# Patient Record
Sex: FEMALE | Race: WHITE | NOT HISPANIC OR LATINO | Employment: PART TIME | URBAN - METROPOLITAN AREA
[De-identification: names, ages, dates, MRNs, and addresses within clinical notes are randomized per-mention and may not be internally consistent; named-entity substitution may affect disease eponyms.]

---

## 2017-01-19 ENCOUNTER — TRANSCRIBE ORDERS (OUTPATIENT)
Dept: ADMINISTRATIVE | Facility: HOSPITAL | Age: 62
End: 2017-01-19

## 2017-01-19 DIAGNOSIS — Z12.31 OTHER SCREENING MAMMOGRAM: Primary | ICD-10-CM

## 2017-01-25 ENCOUNTER — HOSPITAL ENCOUNTER (OUTPATIENT)
Dept: MAMMOGRAPHY | Facility: CLINIC | Age: 62
Discharge: HOME | End: 2017-01-25
Payer: COMMERCIAL

## 2017-01-25 DIAGNOSIS — Z12.31 OTHER SCREENING MAMMOGRAM: ICD-10-CM

## 2017-01-25 PROCEDURE — G0202 SCR MAMMO BI INCL CAD: HCPCS

## 2017-02-01 ENCOUNTER — GENERIC CONVERSION - ENCOUNTER (OUTPATIENT)
Dept: OTHER | Facility: OTHER | Age: 62
End: 2017-02-01

## 2017-03-20 ENCOUNTER — APPOINTMENT (OUTPATIENT)
Dept: LAB | Facility: CLINIC | Age: 62
End: 2017-03-20
Payer: COMMERCIAL

## 2017-03-20 DIAGNOSIS — E78.5 HYPERLIPIDEMIA: ICD-10-CM

## 2017-03-20 DIAGNOSIS — R53.83 OTHER FATIGUE: ICD-10-CM

## 2017-03-20 LAB
ALBUMIN SERPL BCP-MCNC: 3.7 G/DL (ref 3.5–5)
ALP SERPL-CCNC: 62 U/L (ref 46–116)
ALT SERPL W P-5'-P-CCNC: 22 U/L (ref 12–78)
ANION GAP SERPL CALCULATED.3IONS-SCNC: 7 MMOL/L (ref 4–13)
AST SERPL W P-5'-P-CCNC: 17 U/L (ref 5–45)
BASOPHILS # BLD AUTO: 0.01 THOUSANDS/ΜL (ref 0–0.1)
BASOPHILS NFR BLD AUTO: 0 % (ref 0–1)
BILIRUB SERPL-MCNC: 0.57 MG/DL (ref 0.2–1)
BUN SERPL-MCNC: 12 MG/DL (ref 5–25)
CALCIUM SERPL-MCNC: 8.7 MG/DL (ref 8.3–10.1)
CHLORIDE SERPL-SCNC: 101 MMOL/L (ref 100–108)
CHOLEST SERPL-MCNC: 226 MG/DL (ref 50–200)
CO2 SERPL-SCNC: 31 MMOL/L (ref 21–32)
CREAT SERPL-MCNC: 0.75 MG/DL (ref 0.6–1.3)
EOSINOPHIL # BLD AUTO: 0.13 THOUSAND/ΜL (ref 0–0.61)
EOSINOPHIL NFR BLD AUTO: 3 % (ref 0–6)
ERYTHROCYTE [DISTWIDTH] IN BLOOD BY AUTOMATED COUNT: 13.6 % (ref 11.6–15.1)
GFR SERPL CREATININE-BSD FRML MDRD: >60 ML/MIN/1.73SQ M
GLUCOSE P FAST SERPL-MCNC: 87 MG/DL (ref 65–99)
HCT VFR BLD AUTO: 42.2 % (ref 34.8–46.1)
HDLC SERPL-MCNC: 78 MG/DL (ref 40–60)
HGB BLD-MCNC: 14.1 G/DL (ref 11.5–15.4)
LDLC SERPL CALC-MCNC: 127 MG/DL (ref 0–100)
LYMPHOCYTES # BLD AUTO: 1.91 THOUSANDS/ΜL (ref 0.6–4.47)
LYMPHOCYTES NFR BLD AUTO: 42 % (ref 14–44)
MCH RBC QN AUTO: 31.3 PG (ref 26.8–34.3)
MCHC RBC AUTO-ENTMCNC: 33.4 G/DL (ref 31.4–37.4)
MCV RBC AUTO: 94 FL (ref 82–98)
MONOCYTES # BLD AUTO: 0.28 THOUSAND/ΜL (ref 0.17–1.22)
MONOCYTES NFR BLD AUTO: 6 % (ref 4–12)
NEUTROPHILS # BLD AUTO: 2.23 THOUSANDS/ΜL (ref 1.85–7.62)
NEUTS SEG NFR BLD AUTO: 49 % (ref 43–75)
NRBC BLD AUTO-RTO: 0 /100 WBCS
PLATELET # BLD AUTO: 243 THOUSANDS/UL (ref 149–390)
PMV BLD AUTO: 11.4 FL (ref 8.9–12.7)
POTASSIUM SERPL-SCNC: 3.8 MMOL/L (ref 3.5–5.3)
PROT SERPL-MCNC: 6.9 G/DL (ref 6.4–8.2)
RBC # BLD AUTO: 4.51 MILLION/UL (ref 3.81–5.12)
SODIUM SERPL-SCNC: 139 MMOL/L (ref 136–145)
TRIGL SERPL-MCNC: 106 MG/DL
TSH SERPL DL<=0.05 MIU/L-ACNC: 1.53 UIU/ML (ref 0.36–3.74)
WBC # BLD AUTO: 4.57 THOUSAND/UL (ref 4.31–10.16)

## 2017-03-20 PROCEDURE — 84443 ASSAY THYROID STIM HORMONE: CPT

## 2017-03-20 PROCEDURE — 80061 LIPID PANEL: CPT

## 2017-03-20 PROCEDURE — 36415 COLL VENOUS BLD VENIPUNCTURE: CPT

## 2017-03-20 PROCEDURE — 80053 COMPREHEN METABOLIC PANEL: CPT

## 2017-03-20 PROCEDURE — 85025 COMPLETE CBC W/AUTO DIFF WBC: CPT

## 2017-03-29 ENCOUNTER — ALLSCRIPTS OFFICE VISIT (OUTPATIENT)
Dept: OTHER | Facility: OTHER | Age: 62
End: 2017-03-29

## 2017-04-25 ENCOUNTER — LAB REQUISITION (OUTPATIENT)
Dept: LAB | Facility: HOSPITAL | Age: 62
End: 2017-04-25
Payer: COMMERCIAL

## 2017-04-25 ENCOUNTER — ALLSCRIPTS OFFICE VISIT (OUTPATIENT)
Dept: OTHER | Facility: OTHER | Age: 62
End: 2017-04-25

## 2017-04-25 DIAGNOSIS — J02.9 ACUTE PHARYNGITIS: ICD-10-CM

## 2017-04-25 PROCEDURE — 87070 CULTURE OTHR SPECIMN AEROBIC: CPT | Performed by: FAMILY MEDICINE

## 2017-04-28 LAB — BACTERIA THROAT CULT: NORMAL

## 2017-05-25 ENCOUNTER — ALLSCRIPTS OFFICE VISIT (OUTPATIENT)
Dept: OTHER | Facility: OTHER | Age: 62
End: 2017-05-25

## 2017-05-25 DIAGNOSIS — M54.50 LOW BACK PAIN: ICD-10-CM

## 2017-05-25 LAB
BILIRUB UR QL STRIP: NORMAL
CLARITY UR: NORMAL
COLOR UR: CLEAR
GLUCOSE (HISTORICAL): NORMAL
HGB UR QL STRIP.AUTO: NORMAL
KETONES UR STRIP-MCNC: NORMAL MG/DL
LEUKOCYTE ESTERASE UR QL STRIP: NORMAL
NITRITE UR QL STRIP: NORMAL
PH UR STRIP.AUTO: 5 [PH]
PROT UR STRIP-MCNC: NORMAL MG/DL
SP GR UR STRIP.AUTO: 1
UROBILINOGEN UR QL STRIP.AUTO: 0.2

## 2017-05-26 ENCOUNTER — GENERIC CONVERSION - ENCOUNTER (OUTPATIENT)
Dept: OTHER | Facility: OTHER | Age: 62
End: 2017-05-26

## 2017-05-26 ENCOUNTER — APPOINTMENT (OUTPATIENT)
Dept: PHYSICAL THERAPY | Facility: CLINIC | Age: 62
End: 2017-05-26
Payer: COMMERCIAL

## 2017-05-26 DIAGNOSIS — M54.50 LOW BACK PAIN: ICD-10-CM

## 2017-05-26 PROCEDURE — G0283 ELEC STIM OTHER THAN WOUND: HCPCS

## 2017-05-26 PROCEDURE — 97014 ELECTRIC STIMULATION THERAPY: CPT

## 2017-05-26 PROCEDURE — 97161 PT EVAL LOW COMPLEX 20 MIN: CPT

## 2017-05-30 ENCOUNTER — APPOINTMENT (OUTPATIENT)
Dept: PHYSICAL THERAPY | Facility: CLINIC | Age: 62
End: 2017-05-30
Payer: COMMERCIAL

## 2017-05-30 PROCEDURE — 97014 ELECTRIC STIMULATION THERAPY: CPT

## 2017-05-30 PROCEDURE — 97110 THERAPEUTIC EXERCISES: CPT

## 2017-05-30 PROCEDURE — 97140 MANUAL THERAPY 1/> REGIONS: CPT

## 2017-05-30 PROCEDURE — G0283 ELEC STIM OTHER THAN WOUND: HCPCS

## 2017-06-01 ENCOUNTER — APPOINTMENT (OUTPATIENT)
Dept: PHYSICAL THERAPY | Facility: CLINIC | Age: 62
End: 2017-06-01
Payer: COMMERCIAL

## 2017-06-01 PROCEDURE — G0283 ELEC STIM OTHER THAN WOUND: HCPCS

## 2017-06-01 PROCEDURE — 97110 THERAPEUTIC EXERCISES: CPT

## 2017-06-01 PROCEDURE — 97140 MANUAL THERAPY 1/> REGIONS: CPT

## 2017-06-01 PROCEDURE — 97014 ELECTRIC STIMULATION THERAPY: CPT

## 2017-06-06 ENCOUNTER — APPOINTMENT (OUTPATIENT)
Dept: PHYSICAL THERAPY | Facility: CLINIC | Age: 62
End: 2017-06-06
Payer: COMMERCIAL

## 2017-06-06 PROCEDURE — 97140 MANUAL THERAPY 1/> REGIONS: CPT

## 2017-06-06 PROCEDURE — 97014 ELECTRIC STIMULATION THERAPY: CPT

## 2017-06-06 PROCEDURE — G0283 ELEC STIM OTHER THAN WOUND: HCPCS

## 2017-06-06 PROCEDURE — 97110 THERAPEUTIC EXERCISES: CPT

## 2017-06-08 ENCOUNTER — APPOINTMENT (OUTPATIENT)
Dept: PHYSICAL THERAPY | Facility: CLINIC | Age: 62
End: 2017-06-08
Payer: COMMERCIAL

## 2017-06-08 PROCEDURE — G0283 ELEC STIM OTHER THAN WOUND: HCPCS

## 2017-06-08 PROCEDURE — 97110 THERAPEUTIC EXERCISES: CPT

## 2017-06-08 PROCEDURE — 97140 MANUAL THERAPY 1/> REGIONS: CPT

## 2017-06-08 PROCEDURE — 97010 HOT OR COLD PACKS THERAPY: CPT

## 2017-06-08 PROCEDURE — 97014 ELECTRIC STIMULATION THERAPY: CPT

## 2017-06-12 ENCOUNTER — APPOINTMENT (OUTPATIENT)
Dept: PHYSICAL THERAPY | Facility: CLINIC | Age: 62
End: 2017-06-12
Payer: COMMERCIAL

## 2017-06-12 PROCEDURE — 97140 MANUAL THERAPY 1/> REGIONS: CPT

## 2017-06-12 PROCEDURE — 97010 HOT OR COLD PACKS THERAPY: CPT

## 2017-06-12 PROCEDURE — 97110 THERAPEUTIC EXERCISES: CPT

## 2017-06-12 PROCEDURE — G0283 ELEC STIM OTHER THAN WOUND: HCPCS

## 2017-06-12 PROCEDURE — 97014 ELECTRIC STIMULATION THERAPY: CPT

## 2017-06-13 ENCOUNTER — APPOINTMENT (OUTPATIENT)
Dept: PHYSICAL THERAPY | Facility: CLINIC | Age: 62
End: 2017-06-13
Payer: COMMERCIAL

## 2017-06-13 PROCEDURE — 97140 MANUAL THERAPY 1/> REGIONS: CPT

## 2017-06-13 PROCEDURE — 97010 HOT OR COLD PACKS THERAPY: CPT

## 2017-06-13 PROCEDURE — 97110 THERAPEUTIC EXERCISES: CPT

## 2017-06-13 PROCEDURE — G0283 ELEC STIM OTHER THAN WOUND: HCPCS

## 2017-06-13 PROCEDURE — 97014 ELECTRIC STIMULATION THERAPY: CPT

## 2017-06-16 ENCOUNTER — APPOINTMENT (OUTPATIENT)
Dept: PHYSICAL THERAPY | Facility: CLINIC | Age: 62
End: 2017-06-16
Payer: COMMERCIAL

## 2017-06-16 PROCEDURE — 97110 THERAPEUTIC EXERCISES: CPT

## 2017-06-16 PROCEDURE — 97010 HOT OR COLD PACKS THERAPY: CPT

## 2017-06-16 PROCEDURE — G0283 ELEC STIM OTHER THAN WOUND: HCPCS

## 2017-06-16 PROCEDURE — 97014 ELECTRIC STIMULATION THERAPY: CPT

## 2017-06-16 PROCEDURE — 97140 MANUAL THERAPY 1/> REGIONS: CPT

## 2017-06-19 ENCOUNTER — APPOINTMENT (OUTPATIENT)
Dept: PHYSICAL THERAPY | Facility: CLINIC | Age: 62
End: 2017-06-19
Payer: COMMERCIAL

## 2017-06-19 PROCEDURE — 97110 THERAPEUTIC EXERCISES: CPT

## 2017-06-19 PROCEDURE — 97014 ELECTRIC STIMULATION THERAPY: CPT

## 2017-06-19 PROCEDURE — G0283 ELEC STIM OTHER THAN WOUND: HCPCS

## 2017-06-21 ENCOUNTER — APPOINTMENT (OUTPATIENT)
Dept: PHYSICAL THERAPY | Facility: CLINIC | Age: 62
End: 2017-06-21
Payer: COMMERCIAL

## 2017-06-21 PROCEDURE — 97110 THERAPEUTIC EXERCISES: CPT

## 2017-06-21 PROCEDURE — 97010 HOT OR COLD PACKS THERAPY: CPT

## 2017-06-23 ENCOUNTER — GENERIC CONVERSION - ENCOUNTER (OUTPATIENT)
Dept: OTHER | Facility: OTHER | Age: 62
End: 2017-06-23

## 2017-06-23 ENCOUNTER — APPOINTMENT (OUTPATIENT)
Dept: PHYSICAL THERAPY | Facility: CLINIC | Age: 62
End: 2017-06-23
Payer: COMMERCIAL

## 2017-06-23 PROCEDURE — 97110 THERAPEUTIC EXERCISES: CPT

## 2017-06-23 PROCEDURE — G0283 ELEC STIM OTHER THAN WOUND: HCPCS

## 2017-06-23 PROCEDURE — 97014 ELECTRIC STIMULATION THERAPY: CPT

## 2017-06-26 ENCOUNTER — APPOINTMENT (OUTPATIENT)
Dept: PHYSICAL THERAPY | Facility: CLINIC | Age: 62
End: 2017-06-26
Payer: COMMERCIAL

## 2017-06-28 ENCOUNTER — APPOINTMENT (OUTPATIENT)
Dept: PHYSICAL THERAPY | Facility: CLINIC | Age: 62
End: 2017-06-28
Payer: COMMERCIAL

## 2017-06-30 ENCOUNTER — APPOINTMENT (OUTPATIENT)
Dept: PHYSICAL THERAPY | Facility: CLINIC | Age: 62
End: 2017-06-30
Payer: COMMERCIAL

## 2017-10-03 ENCOUNTER — APPOINTMENT (OUTPATIENT)
Dept: RADIOLOGY | Facility: CLINIC | Age: 62
End: 2017-10-03
Payer: COMMERCIAL

## 2017-10-03 ENCOUNTER — APPOINTMENT (OUTPATIENT)
Dept: LAB | Facility: CLINIC | Age: 62
End: 2017-10-03
Payer: COMMERCIAL

## 2017-10-03 ENCOUNTER — TRANSCRIBE ORDERS (OUTPATIENT)
Dept: ADMINISTRATIVE | Facility: HOSPITAL | Age: 62
End: 2017-10-03

## 2017-10-03 ENCOUNTER — HOSPITAL ENCOUNTER (OUTPATIENT)
Dept: NON INVASIVE DIAGNOSTICS | Facility: CLINIC | Age: 62
Discharge: HOME | End: 2017-10-03
Payer: COMMERCIAL

## 2017-10-03 ENCOUNTER — APPOINTMENT (OUTPATIENT)
Dept: LAB | Facility: HOSPITAL | Age: 62
End: 2017-10-03
Payer: COMMERCIAL

## 2017-10-03 DIAGNOSIS — Z01.818 PREOP EXAMINATION: ICD-10-CM

## 2017-10-03 DIAGNOSIS — Z01.818 PREOP EXAMINATION: Primary | ICD-10-CM

## 2017-10-03 LAB
ALBUMIN SERPL BCP-MCNC: 3.6 G/DL (ref 3.5–5)
ALP SERPL-CCNC: 63 U/L (ref 46–116)
ALT SERPL W P-5'-P-CCNC: 19 U/L (ref 12–78)
ANION GAP SERPL CALCULATED.3IONS-SCNC: 5 MMOL/L (ref 4–13)
APTT PPP: 25 SECONDS (ref 23–35)
AST SERPL W P-5'-P-CCNC: 17 U/L (ref 5–45)
ATRIAL RATE: 70 BPM
BASOPHILS # BLD AUTO: 0.02 THOUSANDS/ΜL (ref 0–0.1)
BASOPHILS NFR BLD AUTO: 0 % (ref 0–1)
BILIRUB SERPL-MCNC: 0.4 MG/DL (ref 0.2–1)
BILIRUB UR QL STRIP: NEGATIVE
BUN SERPL-MCNC: 17 MG/DL (ref 5–25)
CALCIUM SERPL-MCNC: 8.9 MG/DL (ref 8.3–10.1)
CHLORIDE SERPL-SCNC: 103 MMOL/L (ref 100–108)
CLARITY UR: CLEAR
CO2 SERPL-SCNC: 30 MMOL/L (ref 21–32)
COLOR UR: COLORLESS
CREAT SERPL-MCNC: 0.72 MG/DL (ref 0.6–1.3)
EOSINOPHIL # BLD AUTO: 0.13 THOUSAND/ΜL (ref 0–0.61)
EOSINOPHIL NFR BLD AUTO: 3 % (ref 0–6)
ERYTHROCYTE [DISTWIDTH] IN BLOOD BY AUTOMATED COUNT: 13.5 % (ref 11.6–15.1)
GFR SERPL CREATININE-BSD FRML MDRD: 90 ML/MIN/1.73SQ M
GLUCOSE P FAST SERPL-MCNC: 79 MG/DL (ref 65–99)
GLUCOSE UR STRIP-MCNC: NEGATIVE MG/DL
HCT VFR BLD AUTO: 43.3 % (ref 34.8–46.1)
HGB BLD-MCNC: 14.5 G/DL (ref 11.5–15.4)
HGB UR QL STRIP.AUTO: NEGATIVE
INR PPP: 0.92 (ref 0.86–1.16)
KETONES UR STRIP-MCNC: NEGATIVE MG/DL
LEUKOCYTE ESTERASE UR QL STRIP: NEGATIVE
LYMPHOCYTES # BLD AUTO: 2.19 THOUSANDS/ΜL (ref 0.6–4.47)
LYMPHOCYTES NFR BLD AUTO: 42 % (ref 14–44)
MCH RBC QN AUTO: 31.9 PG (ref 26.8–34.3)
MCHC RBC AUTO-ENTMCNC: 33.5 G/DL (ref 31.4–37.4)
MCV RBC AUTO: 95 FL (ref 82–98)
MONOCYTES # BLD AUTO: 0.38 THOUSAND/ΜL (ref 0.17–1.22)
MONOCYTES NFR BLD AUTO: 7 % (ref 4–12)
NEUTROPHILS # BLD AUTO: 2.44 THOUSANDS/ΜL (ref 1.85–7.62)
NEUTS SEG NFR BLD AUTO: 48 % (ref 43–75)
NITRITE UR QL STRIP: NEGATIVE
NRBC BLD AUTO-RTO: 0 /100 WBCS
P AXIS: 14 DEGREES
PH UR STRIP.AUTO: 6 [PH] (ref 4.5–8)
PLATELET # BLD AUTO: 276 THOUSANDS/UL (ref 149–390)
PMV BLD AUTO: 11.5 FL (ref 8.9–12.7)
POTASSIUM SERPL-SCNC: 4.4 MMOL/L (ref 3.5–5.3)
PR INTERVAL: 122 MS
PROT SERPL-MCNC: 7 G/DL (ref 6.4–8.2)
PROT UR STRIP-MCNC: NEGATIVE MG/DL
PROTHROMBIN TIME: 12.4 SECONDS (ref 12.1–14.4)
QRS AXIS: 20 DEGREES
QRSD INTERVAL: 72 MS
QT INTERVAL: 382 MS
QTC INTERVAL: 412 MS
RBC # BLD AUTO: 4.55 MILLION/UL (ref 3.81–5.12)
SODIUM SERPL-SCNC: 138 MMOL/L (ref 136–145)
SP GR UR STRIP.AUTO: <=1.005 (ref 1–1.03)
T WAVE AXIS: 24 DEGREES
UROBILINOGEN UR QL STRIP.AUTO: 0.2 E.U./DL
VENTRICULAR RATE: 70 BPM
WBC # BLD AUTO: 5.18 THOUSAND/UL (ref 4.31–10.16)

## 2017-10-03 PROCEDURE — 80053 COMPREHEN METABOLIC PANEL: CPT

## 2017-10-03 PROCEDURE — 85730 THROMBOPLASTIN TIME PARTIAL: CPT

## 2017-10-03 PROCEDURE — 87081 CULTURE SCREEN ONLY: CPT

## 2017-10-03 PROCEDURE — 85610 PROTHROMBIN TIME: CPT

## 2017-10-03 PROCEDURE — 36415 COLL VENOUS BLD VENIPUNCTURE: CPT

## 2017-10-03 PROCEDURE — 93005 ELECTROCARDIOGRAM TRACING: CPT

## 2017-10-03 PROCEDURE — 71020 HB CHEST X-RAY 2VW FRONTAL&LATL: CPT

## 2017-10-03 PROCEDURE — 85025 COMPLETE CBC W/AUTO DIFF WBC: CPT

## 2017-10-03 PROCEDURE — 81003 URINALYSIS AUTO W/O SCOPE: CPT | Performed by: NEUROLOGICAL SURGERY

## 2017-10-04 LAB — MRSA NOSE QL CULT: NORMAL

## 2017-10-26 ENCOUNTER — ALLSCRIPTS OFFICE VISIT (OUTPATIENT)
Dept: OTHER | Facility: OTHER | Age: 62
End: 2017-10-26

## 2017-11-28 ENCOUNTER — ALLSCRIPTS OFFICE VISIT (OUTPATIENT)
Dept: OTHER | Facility: OTHER | Age: 62
End: 2017-11-28

## 2017-11-28 LAB
BILIRUB UR QL STRIP: NORMAL
CLARITY UR: NORMAL
COLOR UR: YELLOW
GLUCOSE (HISTORICAL): NORMAL
HGB UR QL STRIP.AUTO: NORMAL
KETONES UR STRIP-MCNC: NORMAL MG/DL
LEUKOCYTE ESTERASE UR QL STRIP: NORMAL
NITRITE UR QL STRIP: NORMAL
PH UR STRIP.AUTO: 5 [PH]
PROT UR STRIP-MCNC: NORMAL MG/DL
SP GR UR STRIP.AUTO: 1.02
UROBILINOGEN UR QL STRIP.AUTO: 0.2

## 2017-11-29 NOTE — PROGRESS NOTES
Assessment    1  Never a smoker   2  Symptoms involving urinary system (788 99) (R39 9)   3  Headache (784 0) (R51)   4  History of facial surgery (V45 89) (Z98 890)   5  Hemifacial spasm (351 8) (G51 3)    Discussion/Summary    1  headache - possible post op vs chronic migraine? neuro/ent exam normal, advised to follow up with surgeon  s/p nerve decompression due to facial hemispasm - spasm still present, surgery 10/20 in [facility]  urinary symptoms - will send for micro with culture, force fluids  due 03/28/2024due 12/30/2016due 01/09/2018  as needed  Possible side effects of new medications were reviewed with the patient/guardian today  The treatment plan was reviewed with the patient/guardian  The patient/guardian understands and agrees with the treatment plan      Chief Complaint  Pt presents in the office today with c/o a headache and a poss UTI;  due 03/28/2024due 12/30/2016due 01/09/2018      History of Present Illness  HPI: Patient has had a headache for two weeks  After stopping prednisone for shingles  Started with a headache, on L side head, 10/16 stopped prednisone for shingles and started with a headache, has now had a headache for two weeks  From temple to temple, feels like intense pressure, nauseated, vomiting twice in the past two weeks, taking Tylenol every 8 hours with some relief but not this morning but thinks it was because she threw up  If no Tylenol 7:10  Denies blurry vision, confusion, numbness  notes yesterday had urgency and cloudy and foul smelling urine  Today felt ok  Denies fever, chills  Active Problems  1  Allergic rhinitis (477 9) (J30 9)   2  Hemifacial spasm (351 8) (G51 3)   3  Hyperlipidemia (272 4) (E78 5)   4  Osteopenia (733 90) (M85 80)   5  Vitamin D deficiency (268 9) (E55 9)    Past Medical History    1  Acute bronchitis (466 0) (J20 9)   2  History of Colonoscopy (Fiberoptic) Screening   3   History of Encounter for screening mammogram for malignant neoplasm of breast (V76 12) (Z12 31)   4  History of Facial twitching (351 8) (G51 4)   5  History of acute otitis media (V12 49) (Z86 69)   6  History of acute sinusitis (V12 69) (Z87 09)   7  History of viral infection (V12 09) (Z86 19)   8  History of Nasal congestion (478 19) (R09 81)   9  History of Rib pain on left side (786 50) (R07 81)   10  History of Sore throat (462) (J02 9)  Active Problems And Past Medical History Reviewed: The active problems and past medical history were reviewed and updated today  Family History  Mother    1  Family history of Alzheimer Disease   2  Denied: Family history of substance abuse   3  Family history of Hyperlipidemia   4  Denied: Family history of Mental health problem  Father    5  Family history of Dementia   6  Denied: Family history of substance abuse   7  Family history of Macular Degeneration   8  Denied: Family history of Mental health problem  Daughter    5  Family history of lymphoma (V16 7) (Z80 2)  Paternal Cousin    8  Family history of pancreatic cancer (V16 0) (Z80 0)  Family History Reviewed: The family history was reviewed and updated today  Social History   · Always uses seat belt   · Drinks coffee   · Drinks 1 cup a day   · Has smoke detectors   · Never a smoker   · No illicit drug use   · Social alcohol use (Z78 9)   · Drinks 1 glass of wine a month   · Uses Safety Equipment - Seatbelts  The social history was reviewed and updated today  The social history was reviewed and is unchanged  Surgical History    1  History of Biopsy Breast Percutaneous Needle Core  Surgical History Reviewed: The surgical history was reviewed and updated today  Current Meds   1  Calcium TABS; Take 1 tablet daily; Therapy: (Recorded:34Sax0787) to Recorded   2  Fluticasone Propionate 50 MCG/ACT Nasal Suspension; USE 2 SPRAYS IN EACH NOSTRIL ONCE DAILY AS NEEDED; Therapy: 43NJP8030 to (Evaluate:09Odb5152)  Requested for: 23Oct2017; Last QT:42AQL0666 Ordered   3  Multiple Vitamins TABS; TAKE 1 TABLET DAILY; Therapy: (Recorded:62Qzy2160) to Recorded   4  Saline Nasal Spray 0 65 % Nasal Solution; 2 spray each nostril twice a day; Therapy: 60Exr9995 to (Last Rx:18Kuj1048) Ordered   5  Vitamin B Complex Oral Tablet; TAKE 1 TABLET DAILY; Therapy: (Recorded:24Oxp6114) to Recorded   6  Vitamin D3 1000 UNIT Oral Tablet; TAKE 1 TABLET DAILY; Last Rx:23Mar2016 Ordered    The medication list was reviewed and updated today  Allergies  1  No Known Drug Allergies  2  Other   3  Seasonal    Vitals   Recorded: 98YUK4887 01:44PM   Heart Rate 76   Respiration 14   Systolic 041   Diastolic 72   Weight 893 lb        Physical Exam   Constitutional  General appearance: No acute distress, well appearing and well nourished  Eyes  Conjunctiva and lids: No swelling, erythema or discharge  Pupils and irises: Equal, round and reactive to light  Ears, Nose, Mouth, and Throat  External inspection of ears and nose: Normal    Otoscopic examination: Tympanic membranes translucent with normal light reflex  Canals patent without erythema  Nasal mucosa, septum, and turbinates: Normal without edema or erythema  Oropharynx: Normal with no erythema, edema, exudate or lesions  Pulmonary  Respiratory effort: No increased work of breathing or signs of respiratory distress  Auscultation of lungs: Clear to auscultation  Cardiovascular  Palpation of heart: Normal PMI, no thrills  Auscultation of heart: Normal rate and rhythm, normal S1 and S2, without murmurs  Lymphatic  Palpation of lymph nodes in neck: No lymphadenopathy  Skin  Skin and subcutaneous tissue: Normal without rashes or lesions  Neurologic  Cranial nerves: Cranial nerves 2-12 intact  Reflexes: 2+ and symmetric  Sensation: No sensory loss     Psychiatric  Orientation to person, place, and time: Normal    Mood and affect: Normal          Results/Data  Urine Dip Non-Automated- POC 80QTK2184 01:49PM Rhys Blaircolo     Test Name Result Flag Reference   Color Yellow       Clarity Transparent     Leukocytes neg     Nitrite neg     Blood trace     Bilirubin neg     Urobilinogen 0 2     Protein neg     Ph 5 0     Specific Gravity 1 025     Ketone small     Glucose neg           Signatures   Electronically signed by : Gold Dye [facility]; Nov 28 2017  2:09PM EST                       (Author)    Electronically signed by : MCKAYLA Hughes ; Nov 28 2017  3:00PM EST                       (Author)

## 2017-12-12 ENCOUNTER — ALLSCRIPTS OFFICE VISIT (OUTPATIENT)
Dept: OTHER | Facility: OTHER | Age: 62
End: 2017-12-12

## 2018-01-09 ENCOUNTER — GENERIC CONVERSION - ENCOUNTER (OUTPATIENT)
Dept: OTHER | Facility: OTHER | Age: 63
End: 2018-01-09

## 2018-01-09 ENCOUNTER — ALLSCRIPTS OFFICE VISIT (OUTPATIENT)
Dept: OTHER | Facility: OTHER | Age: 63
End: 2018-01-09

## 2018-01-12 VITALS
TEMPERATURE: 99.2 F | WEIGHT: 140.4 LBS | HEART RATE: 64 BPM | SYSTOLIC BLOOD PRESSURE: 110 MMHG | DIASTOLIC BLOOD PRESSURE: 78 MMHG | HEIGHT: 62 IN | BODY MASS INDEX: 25.83 KG/M2 | RESPIRATION RATE: 12 BRPM

## 2018-01-12 VITALS
HEART RATE: 76 BPM | RESPIRATION RATE: 14 BRPM | SYSTOLIC BLOOD PRESSURE: 108 MMHG | DIASTOLIC BLOOD PRESSURE: 72 MMHG | WEIGHT: 135 LBS

## 2018-01-12 NOTE — PROGRESS NOTES
Assessment    1  Encounter for preventive health examination (V70 0) (Z00 00)    Plan  Hyperlipidemia    · (1) LIPID PANEL FASTING W DIRECT LDL REFLEX; Status:Active; Requested  for:82Uvi5703;    · Begin a limited exercise program ; Status:Complete;   Done: 06HGC2656 09:08AM   · Eat a low fat and low cholesterol diet ; Status:Complete;   Done: 83SOX2487 09:08AM  Screening for osteoporosis    · * DXA BONE DENSITY SPINE HIP AND PELVIS; Status:Active; Requested  for:23Mar2016;   Vitamin D deficiency    · From  Vitamin D TABS Take 1 tablet daily To Vitamin D3 1000 UNIT Oral  Tablet TAKE 1 TABLET DAILY    Discussion/Summary  health maintenance visit Currently, she has an adequate exercise regimen  cervical cancer screening is current Breast cancer screening: mammogram is current  Colorectal cancer screening: colorectal cancer screening is current and the next colonoscopy is due 2024  Osteoporosis screening: bone mineral density testing has been ordered  The discussed Zostavax  Advice and education were given regarding nutrition, weight bearing exercise, calcium supplements, vitamin D supplements, sunscreen use and self skin examination  Patient discussion: discussed with the patient  F/u in 6 months with lipid panel done prior  Chief Complaint  pt here for yearly pe    colonoscopy due 03/28/2024  mammo due 12/30/2016  pap awaiting results from [facility]      History of Present Illness  HM, Adult Female: The patient is being seen for a health maintenance evaluation  General Health: The patient's health since the last visit is described as good  She has regular dental visits  She denies vision problems  She denies hearing loss  Immunizations status: up to date  Lifestyle:  She does not have a healthy diet  She exercises regularly  She does not use tobacco  She consumes alcohol  She reports occasional alcohol use and drinking 1-2 drinks per week  She typically drinks wine  She denies drug use     Reproductive health: the patient is postmenopausal    Screening:      Review of Systems    Constitutional: No fever, no chills, feels well, no tiredness, no recent weight gain or weight loss  Eyes: no eye pain, no eyesight problems, eyes not red and no purulent discharge from the eyes  ENT: no earache, no nosebleeds, no sore throat, no nasal discharge and no hoarseness  Cardiovascular: No complaints of slow heart rate, no fast heart rate, no chest pain, no palpitations, no leg claudication, no lower extremity edema  Respiratory: No complaints of shortness of breath, no wheezing, no cough, no SOB on exertion, no orthopnea, no PND  Gastrointestinal: No complaints of abdominal pain, no constipation, no nausea or vomiting, no diarrhea, no bloody stools  Genitourinary: no dysuria, no pelvic pain, no vaginal discharge and no unexplained vaginal bleeding  Musculoskeletal: No complaints of arthralgias, no myalgias, no joint swelling or stiffness, no limb pain or swelling  Integumentary: no rashes, no breast pain, no skin lesions and no breast lump  Neurological: no headache, no numbness, no tingling, no dizziness, no limb weakness, no fainting and no difficulty walking  Psychiatric: no anxiety, no sleep disturbances, no depression and no emotional problems  Hematologic/Lymphatic: no swollen glands  Active Problems    1  Cough (786 2) (R05)   2  Encounter for routine gynecological examination (V72 31) (Z01 419)   3  Encounter for screening mammogram for malignant neoplasm of breast (V76 12)   (Z12 31)   4  Laboratory examination ordered as part of a routine general medical examination   (V72 62) (Z00 00)   5  Osteopenia (733 90) (M85 80)   6  Screening for osteoporosis (V82 81) (Z13 820)   7   Vitamin D deficiency (268 9) (E55 9)    Past Medical History    · History of Colonoscopy (Fiberoptic) Screening   · History of Encounter for screening mammogram for malignant neoplasm of breast  (V76 12) (Z12 31)   · History of acute sinusitis (V12 69) (Z87 09)   · History of viral infection (V12 09) (Z86 19)    Surgical History    · History of Biopsy Breast Percutaneous Needle Core    Family History    · Family history of Alzheimer Disease   · Family history of Hyperlipidemia    · Family history of Dementia   · Family history of Macular Degeneration    · Family history of pancreatic cancer (V16 0) (Z80 0)    Social History    · Alcohol Use (History)   · Social usage, 2 glasses of wine week  · Caffeine Use   · 1 cup coffee per day   · Denied: History of Drug Use   · Has smoke detectors   · Never a smoker   · Denied: History of Never Drank Alcohol   · Uses Safety Equipment - Seatbelts    Current Meds   1  Calcium TABS; Take 1 tablet daily; Therapy: (Recorded:64Jkt7451) to Recorded   2  Fluticasone Propionate 50 MCG/ACT Nasal Suspension; use 2 sprays in each nostril   once daily; Therapy: 22LYY5374 to (Evaluate:24Mar2016)  Requested for: 28Yzl3939; Last   Rx:59Gnb2210 Ordered   3  Multiple Vitamins TABS; TAKE 1 TABLET DAILY; Therapy: (Recorded:35Tyc2655) to Recorded   4  Vitamin B Complex Oral Tablet; TAKE 1 TABLET DAILY; Therapy: (Recorded:27Wsj3375) to Recorded   5  Vitamin D TABS; Take 1 tablet daily; Therapy: (Recorded:25Cnd5546) to Recorded    Allergies    1  No Known Drug Allergies    2  Other   3  Seasonal    Vitals   Recorded: 31JLN3550 08:37AM   Heart Rate 68   Respiration 16   Systolic 783, LUE, Sitting   Diastolic 62, LUE, Sitting   Height 5 ft 1 75 in   Weight 137 lb    BMI Calculated 25 26   BSA Calculated 1 62     Physical Exam    Constitutional   General appearance: No acute distress, well appearing and well nourished  Eyes   Conjunctiva and lids: No swelling, erythema or discharge  PERRL, EOMI  Ears, Nose, Mouth, and Throat   Otoscopic examination: Tympanic membranes translucent with normal light reflex  Canals patent without erythema  Lips, teeth, and gums: Normal, good dentition  Oropharynx: Normal with no erythema, edema, exudate or lesions  Neck   Neck: Supple, symmetric, trachea midline, no masses  Thyroid: Normal, no thyromegaly  Pulmonary   Respiratory effort: No increased work of breathing or signs of respiratory distress  Auscultation of lungs: Clear to auscultation  Cardiovascular   Auscultation of heart: Normal rate and rhythm, normal S1 and S2, no murmurs  Examination of extremities for edema and/or varicosities: Normal     Abdomen   Abdomen: Non-tender, no masses  Liver and spleen: No hepatomegaly or splenomegaly  Lymphatic   Palpation of lymph nodes in neck: No lymphadenopathy  Neurologic   Cranial nerves: Cranial nerves II-XII intact  Reflexes: 2+ and symmetric  Results/Data  PHQ-2 Adult Depression Screening 23Mar2016 08:42AM User, Ahs     Test Name Result Flag Reference   PHQ-2 Adult Depression Score 0     Q1: 0, Q2: 0   PHQ-2 Adult Depression Screening Negative       (Q) LIPID PANEL WITH REFLEX TO DIRECT LDL 43MZZ1662 10:37AM Jone Fried     Test Name Result Flag Reference   CHOLESTEROL, TOTAL 253 mg/dL H 125-200   HDL CHOLESTEROL 85 mg/dL  > OR = 46   TRIGLICERIDES 65 mg/dL  <231   LDL-CHOLESTEROL 155 mg/dL (calc) H <130   Desirable range <100 mg/dL for patients with CHD or  diabetes and <70 mg/dL for diabetic patients with  known heart disease  CHOL/HDLC RATIO 3 0 (calc)  < OR = 5 0   NON HDL CHOLESTEROL 168 mg/dL (calc) H    Target for non-HDL cholesterol is 30 mg/dL higher than   LDL cholesterol target  (1) COMPREHENSIVE METABOLIC PANEL 83DKU9298 63:55AS Jone Fried     Test Name Result Flag Reference   GLUCOSE 95 mg/dL  65-99   Fasting reference interval   UREA NITROGEN (BUN) 15 mg/dL  7-25   CREATININE 0 94 mg/dL  0 50-0 99   For patients >52years of age, the reference limit  for Creatinine is approximately 13% higher for people  identified as -American  eGFR NON-AFR   AMERICAN 66 mL/min/1 73m2  > OR = 60 eGFR  76 mL/min/1 73m2  > OR = 60   BUN/CREATININE RATIO   9-36   NOT APPLICABLE (calc)   SODIUM 140 mmol/L  135-146   POTASSIUM 4 2 mmol/L  3 5-5 3   CHLORIDE 100 mmol/L     CARBON DIOXIDE 28 mmol/L  19-30   CALCIUM 9 6 mg/dL  8 6-10 4   PROTEIN, TOTAL 7 0 g/dL  6 1-8 1   ALBUMIN 4 4 g/dL  3 6-5 1   GLOBULIN 2 6 g/dL (calc)  1 9-3 7   ALBUMIN/GLOBULIN RATIO 1 7 (calc)  1 0-2 5   BILIRUBIN, TOTAL 0 7 mg/dL  0 2-1 2   ALKALINE PHOSPHATASE 54 U/L     AST 26 U/L  10-35   ALT 19 U/L  6-29     (1) CBC/PLT/DIFF 70CNX3718 10:37AM Gerold Heal     Test Name Result Flag Reference   WHITE BLOOD CELL COUNT 5 2 Thousand/uL  3 8-10 8   RED BLOOD CELL COUNT 4 73 Million/uL  3 80-5 10   HEMOGLOBIN 14 6 g/dL  11 7-15 5   HEMATOCRIT 44 7 %  35 0-45 0   MCV 94 5 fL  80 0-100 0   MCH 30 8 pg  27 0-33 0   MCHC 32 6 g/dL  32 0-36 0   RDW 13 5 %  11 0-15 0   PLATELET COUNT 363 Thousand/uL  140-400   MPV 10 0 fL  7 5-11 5   ABSOLUTE NEUTROPHILS 2501 cells/uL  5789-9427   ABSOLUTE LYMPHOCYTES 2184 cells/uL  850-3900   ABSOLUTE MONOCYTES 369 cells/uL  200-950   ABSOLUTE EOSINOPHILS 120 cells/uL     ABSOLUTE BASOPHILS 26 cells/uL  0-200   NEUTROPHILS 48 1 %     LYMPHOCYTES 42 0 %     MONOCYTES 7 1 %     EOSINOPHILS 2 3 %     BASOPHILS 0 5 %       (Q) TSH, 3RD GENERATION 94JVO2574 10:37AM Gerold Heal     Test Name Result Flag Reference   TSH 1 57 mIU/L  0 40-4 50     *(Q) VITAMIN D, 25-HYDROXY, LC/MS/MS 67IAO9974 10:37AM Gerold Heal     Test Name Result Flag Reference   VITAMIN D, 25-OH, TOTAL 44 ng/mL     Vitamin D Status         25-OH Vitamin D:     Deficiency:                    <20 ng/mL  Insufficiency:             20 - 29 ng/mL  Optimal:                 > or = 30 ng/mL     For 25-OH Vitamin D testing on patients on   D2-supplementation and patients for whom quantitation   of D2 and D3 fractions is required, the QuestAssureD(TM)  25-OH VIT D, (D2,D3), LC/MS/MS is recommended: order   code 80590 (patients >2yrs)  For more information on this test, go to:  http://education  Learnmetrics/faq/YWN054  (This link is being provided for   informational/educational purposes only )     Digital Bilateral Screening Mammogram With CAD 87SXW8576 12:00AM      Test Name Result Flag Reference   Mammo Screen B/L (Summary)     DIGTL SCREEN MAMMO W/CAD 12/30/2015 NEGATIVE     AMA DIG SCRN MAMM W/ CAD (Summary)       Summary / No summary entered :      No summary entered  Documents attached :      [facility]; Enc: [id number] - Image Encounter - [facility] - [facility]) (Result Document)    Health Management  Encounter for routine gynecological examination   (every) THINPREP PAP; every 2 years; Last 31XAQ0854; Next Due: 93AFV5409; Overdue  Digital Bilateral Screening Mammogram With CAD; every 1 year; Last 00WGK6779; Next  Due: 12Yiv5436; Active  Encounter for screening mammogram for malignant neoplasm of breast   Digital Bilateral Screening Mammogram With CAD; every 1 year; Last 98ZGY2574; Next  Due: 14Whs6233; Active  History of Colonoscopy (Fiberoptic) Screening   COLONOSCOPY; every 10 years; Last 67DCN6181; Next Due: 19UPM2072;  Active    Signatures   Electronically signed by : MCKAYLA Ellison ; Mar 23 2016  9:10AM EST                       (Author)

## 2018-01-13 VITALS
HEIGHT: 62 IN | RESPIRATION RATE: 14 BRPM | DIASTOLIC BLOOD PRESSURE: 70 MMHG | SYSTOLIC BLOOD PRESSURE: 114 MMHG | WEIGHT: 141 LBS | TEMPERATURE: 98.4 F | BODY MASS INDEX: 25.95 KG/M2 | HEART RATE: 76 BPM

## 2018-01-15 NOTE — MISCELLANEOUS
Assessment    1  Never a smoker   2  Shingles (053 9) (B02 9)   3  Hemifacial spasm (351 8) (G51 3)    Plan  Shingles    · Cimetidine 800 MG Oral Tablet; Take 1 tablet twice daily   Rx By: Reyes Peralta; Dispense: 14 Days ; #:28 Tablet; Refill: 0; For: Shingles; GAYLA = N; Verified Transmission to [id number] Last Updated By: System, [employer]; 10/26/2017 11:53:08 AM   · PredniSONE 10 MG Oral Tablet; take 3 tabs twice a day for 1 week, then take 2 tabs  twice a day for 1 week and then take 2 tabs once a day for one week all wtih food   Rx By: Reyes Peralta; Dispense: 0 Days ; #:84 Tablet; Refill: 0; For: Shingles; GAYLA = N; Verified Transmission to [facility]0716 Last Updated By: System, [employer]Script[employer]; 10/26/2017 11:53:09 AM   · ValACYclovir HCl - 1 GM Oral Tablet (Valtrex); TAKE 1 TABLET Every 8 hours   Rx By: Reyes Peralta; Dispense: 7 Days ; #:21 Tablet; Refill: 0; For: Shingles; GAYLA = N; Verified Transmission to [facility]1523 Last Updated By: System, [employer]Script[employer]; 10/26/2017 11:53:09 AM    Discussion/Summary  Discussion Summary:   1  hemifacial spasm - s/p decompression of nerve surgery, healing well, limited pain due to surgery treating well with Tylenol, follow up as scheduled    2  shingles - start prednisone 30 mg twice daily for 7 days, 20 mg twice daily for 7 days then 20 mg once daily for 7 days, you will take Cimetidine twice daily before prednisone, start Valtrex 1 tab 3 x a day for 7 days  Kep area covered  Call for problems  colon due 03/28/24  mammo due 12/30/16, please schedule soon! PAP due 01/09/18  dexa due 09/13/16    f/u as needed  f/u as scheduled  Medication SE Review and Pt Understands Tx: Possible side effects of new medications were reviewed with the patient/guardian today  The treatment plan was reviewed with the patient/guardian   The patient/guardian understands and agrees with the treatment plan      Chief Complaint  Chief Complaint Free Text Note Form: Pt presents to the office for EYAD ROD post surgical d/c Monday, Has c/o shingles like rash on buttocks  colon due 03/28/24  mammo due 12/30/16  PAP due 01/09/18  dexa due 09/13/16      History of Present Illness  TCM Communication [facility]: The patient is being contacted for follow-up after hospitalization and 10/24/2017  Hospital course was discussed with the inpatient physician and records were not available  She was hospitalized at [facility]  The date of admission: 10/20/17, date of discharge: 10/23/2017  Diagnosis: Hemifacial spasm- retromastoid craniectomy/decompression of facial nerve  She was discharged to home  Medications reviewed and updated today  She scheduled a follow up appointment  Follow-up appointments with other specialists: neuro/ surgeon  The patient is currently experiencing symptoms  Rash on buttocks/ possible shingles-VNA not concerned Counseling was provided to the patient  Communication performed and completed by DAKOTA 10/26/2017   HPI: Patient here for follow up from hospital discharge  Patient had L sided hemifacial spasm, had a preop brain MRI scan that confirmed arterial compression along the attached segement of the facial nerve by the vetebral artery  EMG confirmed hemifacial spasm  Saw Dr Adina Argueta at [facility] and he scheduled a L retromastoid craniectomy, microvascular decompression of facial nerve  Was successful, patient tolerated procedure without complaints, vomited 48 hours post op but that was treated well with Zofran, discharged home on Oct 23, Tylenol taking care of pain for headache  No complaints  Uneventful stay  Saturday night, 10/21/17, patient noticed two small white head on bottom, mentioned it to nurse and nurse said "she was not concerned", never rechecked   Was on oxycodone 10 mg post op in hospital so cannot comment on pain, Once getting home symptoms got worse,a tingling pain is radiating down L leg, the lesions are multiplying and now some are even oozing  Sister noted more lesions cropping up  Currently only taking Tylenol for post op and rash pain  Review of Systems  Complete-Female:   Constitutional: No fever, no chills, feels well, no tiredness, no recent weight gain or weight loss  Eyes: No complaints of eye pain, no red eyes, no eyesight problems, no discharge, no dry eyes, no itching of eyes  ENT: no complaints of earache, no loss of hearing, no nose bleeds, no nasal discharge, no sore throat, no hoarseness  Cardiovascular: No complaints of slow heart rate, no fast heart rate, no chest pain, no palpitations, no leg claudication, no lower extremity edema  Respiratory: No complaints of shortness of breath, no wheezing, no cough, no SOB on exertion, no orthopnea, no PND  Gastrointestinal: No complaints of abdominal pain, no constipation, no nausea or vomiting, no diarrhea, no bloody stools  Genitourinary: No complaints of dysuria, no incontinence, no pelvic pain, no dysmenorrhea, no vaginal discharge or bleeding  Musculoskeletal: No complaints of arthralgias, no myalgias, no joint swelling or stiffness, no limb pain or swelling  Integumentary: as noted in HPI  Neurological: headache, but as noted in HPI, no numbness, no tingling, no confusion, no dizziness, no limb weakness, no convulsions, no fainting and no difficulty walking  Psychiatric: Not suicidal, no sleep disturbance, no anxiety or depression, no change in personality, no emotional problems  Endocrine: No complaints of proptosis, no hot flashes, no muscle weakness, no deepening of the voice, no feelings of weakness  Hematologic/Lymphatic: No complaints of swollen glands, no swollen glands in the neck, does not bleed easily, does not bruise easily  Active Problems    1  Abdominal pain, LLQ (left lower quadrant) (789 04) (R10 32)   2  Allergic rhinitis (477 9) (J30 9)   3  Encounter for routine gynecological examination (V72 31) (Z01 419)   4   Encounter for screening mammogram for malignant neoplasm of breast (V76 12)   (Z12 31)   5  Hyperlipidemia (272 4) (E78 5)   6  Low back pain (724 2) (M54 5)   7  Osteopenia (733 90) (M85 80)   8  Screening for osteoporosis (V82 81) (Z13 820)   9  Vitamin D deficiency (268 9) (E55 9)    Past Medical History    1  Acute bronchitis (466 0) (J20 9)   2  History of Colonoscopy (Fiberoptic) Screening   3  History of Encounter for screening mammogram for malignant neoplasm of breast   (V76 12) (Z12 31)   4  History of Facial twitching (351 8) (G51 4)   5  History of acute otitis media (V12 49) (Z86 69)   6  History of acute sinusitis (V12 69) (Z87 09)   7  History of viral infection (V12 09) (Z86 19)   8  History of Nasal congestion (478 19) (R09 81)   9  History of Rib pain on left side (786 50) (R07 81)   10  History of Sore throat (462) (J02 9)    Surgical History    1  History of Biopsy Breast Percutaneous Needle Core  Surgical History Reviewed: The surgical history was reviewed and updated today  Family History  Mother    1  Family history of Alzheimer Disease   2  Denied: Family history of substance abuse   3  Family history of Hyperlipidemia   4  Denied: Family history of Mental health problem  Father    5  Family history of Dementia   6  Denied: Family history of substance abuse   7  Family history of Macular Degeneration   8  Denied: Family history of Mental health problem  Daughter    5  Family history of lymphoma (V16 7) (Z80 2)  Paternal Cousin    8  Family history of pancreatic cancer (V16 0) (Z80 0)  Family History Reviewed: The family history was reviewed and updated today  Social History    · Always uses seat belt   · Drinks coffee   · Has smoke detectors   · Never a smoker   · No illicit drug use   · Social alcohol use (Z78 9)   · Uses Safety Equipment - Seatbelts  Social History Reviewed: The social history was reviewed and updated today  The social history was reviewed and is unchanged        Current Meds   1  Calcium TABS; Take 1 tablet daily; Therapy: (Recorded:06Tqz4584) to Recorded   2  Fluticasone Propionate 50 MCG/ACT Nasal Suspension; USE 2 SPRAYS IN EACH   NOSTRIL ONCE DAILY AS NEEDED; Therapy: 98LBI5498 to (Evaluate:15Ylh8987)  Requested for: 23Oct2017; Last   OH:84RDP3136 Ordered   3  Multiple Vitamins TABS; TAKE 1 TABLET DAILY; Therapy: (Recorded:81Lqg2979) to Recorded   4  Saline Nasal Spray 0 65 % Nasal Solution; 2 spray each nostril twice a day; Therapy: 66Zpv8232 to (Last Rx:25Apr2017) Ordered   5  Vitamin B Complex Oral Tablet; TAKE 1 TABLET DAILY; Therapy: (Recorded:53Hzk3980) to Recorded   6  Vitamin D3 1000 UNIT Oral Tablet; TAKE 1 TABLET DAILY; Last Rx:23Mar2016 Ordered  Medication List Reviewed: The medication list was reviewed and updated today  Allergies    1  No Known Drug Allergies    2  Other   3  Seasonal    Vitals  Signs   Recorded: 70PPA7603 11:18AM   Temperature: 97 9 F, Oral  Heart Rate: 80, L Radial  Pulse Quality: Regular, L Radial  Respiration Quality: Normal  Respiration: 12  Systolic: 081, LUE, Sitting  Diastolic: 64, LUE, Sitting  Height: 5 ft 1 5 in  Weight: 135 lb 9 6 oz  BMI Calculated: 25 21  BSA Calculated: 1 61    Physical Exam    Constitutional   General appearance: No acute distress, well appearing and well nourished  Pulmonary   Respiratory effort: No increased work of breathing or signs of respiratory distress  Auscultation of lungs: Clear to auscultation  Cardiovascular   Palpation of heart: Normal PMI, no thrills  Auscultation of heart: Normal rate and rhythm, normal S1 and S2, without murmurs  Lymphatic   Palpation of lymph nodes in neck: No lymphadenopathy  Skin   Skin and subcutaneous tissue: Abnormal   L retromastoid linear scab healing nicely, no erythema, swelling, tenderness  #2 - R S2/3 dermatone R gluteal region with 2mm vesciular lesions on an erythematous base     Psychiatric   Orientation to person, place, and time: Normal     Mood and affect: Normal          Results/Data  reviewed hospital stay records with both patient and sister        Health Management  Encounter for routine gynecological examination   (every) THINPREP PAP; every 5 years; Last [id number]; Next Due: 85WKR7088; Near Due  History of Colonoscopy (Fiberoptic) Screening   COLONOSCOPY; every 10 years; Last 28TPG7764; Next Due: 06VIE4624;  Active    Signatures   Electronically signed by : George Merritt [facility]; Oct 26 2017  8:03PM EST                       (Author)    Electronically signed by : Andre Ponce DO; Oct 27 2017  5:38PM EST

## 2018-01-22 VITALS
SYSTOLIC BLOOD PRESSURE: 114 MMHG | HEIGHT: 62 IN | DIASTOLIC BLOOD PRESSURE: 74 MMHG | HEART RATE: 80 BPM | RESPIRATION RATE: 16 BRPM | WEIGHT: 140.6 LBS | BODY MASS INDEX: 25.88 KG/M2

## 2018-01-22 VITALS
WEIGHT: 135.6 LBS | RESPIRATION RATE: 12 BRPM | TEMPERATURE: 97.9 F | DIASTOLIC BLOOD PRESSURE: 64 MMHG | SYSTOLIC BLOOD PRESSURE: 110 MMHG | HEART RATE: 80 BPM | HEIGHT: 62 IN | BODY MASS INDEX: 24.95 KG/M2

## 2018-01-23 NOTE — MISCELLANEOUS
Message  Return to work or school:   Yenifer Aguilar is under my professional care  She was seen in my office on January 9,2018   Annetta Barron  may return to work five (5) four hour days per week  I will reevaluate her in early April, 2018  Rima Edgar PA-C        Signatures   Electronically signed by : Hiro Polo, ; Jan 9 2018 12:01PM EST                       (Author)

## 2018-01-24 VITALS
RESPIRATION RATE: 16 BRPM | HEIGHT: 61 IN | HEART RATE: 84 BPM | SYSTOLIC BLOOD PRESSURE: 120 MMHG | WEIGHT: 140.2 LBS | DIASTOLIC BLOOD PRESSURE: 72 MMHG | BODY MASS INDEX: 26.47 KG/M2

## 2018-01-24 VITALS
HEIGHT: 61 IN | WEIGHT: 139.1 LBS | DIASTOLIC BLOOD PRESSURE: 72 MMHG | HEART RATE: 68 BPM | RESPIRATION RATE: 16 BRPM | BODY MASS INDEX: 26.26 KG/M2 | SYSTOLIC BLOOD PRESSURE: 108 MMHG

## 2018-01-26 ENCOUNTER — TRANSCRIBE ORDERS (OUTPATIENT)
Dept: ADMINISTRATIVE | Facility: HOSPITAL | Age: 63
End: 2018-01-26

## 2018-01-26 ENCOUNTER — TELEPHONE (OUTPATIENT)
Dept: FAMILY MEDICINE CLINIC | Facility: CLINIC | Age: 63
End: 2018-01-26

## 2018-01-26 DIAGNOSIS — Z12.39 SCREENING BREAST EXAMINATION: Primary | ICD-10-CM

## 2018-01-26 NOTE — TELEPHONE ENCOUNTER
Patient dropped off a form to be filled out for her STD claim  I put the form in your brown folder  It states that she needs all office notes from October 20, 2017 to present  She has an appointment with you scheduled for 2/2/2018  Should we charge a $15 form fee?

## 2018-02-02 ENCOUNTER — HOSPITAL ENCOUNTER (OUTPATIENT)
Dept: MAMMOGRAPHY | Facility: CLINIC | Age: 63
Discharge: HOME | End: 2018-02-02
Payer: COMMERCIAL

## 2018-02-02 DIAGNOSIS — Z12.39 SCREENING BREAST EXAMINATION: ICD-10-CM

## 2018-02-02 PROCEDURE — 77067 SCR MAMMO BI INCL CAD: CPT

## 2018-02-05 ENCOUNTER — OFFICE VISIT (OUTPATIENT)
Dept: FAMILY MEDICINE CLINIC | Facility: CLINIC | Age: 63
End: 2018-02-05
Payer: COMMERCIAL

## 2018-02-05 VITALS
SYSTOLIC BLOOD PRESSURE: 114 MMHG | RESPIRATION RATE: 14 BRPM | HEART RATE: 62 BPM | HEIGHT: 62 IN | WEIGHT: 141.7 LBS | BODY MASS INDEX: 26.07 KG/M2 | DIASTOLIC BLOOD PRESSURE: 72 MMHG

## 2018-02-05 DIAGNOSIS — G51.39 HEMIFACIAL SPASM: Primary | ICD-10-CM

## 2018-02-05 DIAGNOSIS — E55.9 VITAMIN D DEFICIENCY: ICD-10-CM

## 2018-02-05 DIAGNOSIS — M85.80 OSTEOPENIA, UNSPECIFIED LOCATION: ICD-10-CM

## 2018-02-05 DIAGNOSIS — E78.00 PURE HYPERCHOLESTEROLEMIA: ICD-10-CM

## 2018-02-05 PROBLEM — J30.9 ALLERGIC RHINITIS: Status: ACTIVE | Noted: 2017-03-29

## 2018-02-05 PROCEDURE — 99214 OFFICE O/P EST MOD 30 MIN: CPT | Performed by: PHYSICIAN ASSISTANT

## 2018-02-05 RX ORDER — BIOTIN 1 MG
1 TABLET ORAL DAILY
COMMUNITY

## 2018-02-05 RX ORDER — FLUTICASONE PROPIONATE 50 MCG
2 SPRAY, SUSPENSION (ML) NASAL DAILY PRN
COMMUNITY
Start: 2014-07-09 | End: 2018-05-21 | Stop reason: SDUPTHER

## 2018-02-05 RX ORDER — MULTIVITAMIN WITH IRON
1 TABLET ORAL DAILY
COMMUNITY
End: 2020-07-27 | Stop reason: SDUPTHER

## 2018-02-05 NOTE — PROGRESS NOTES
Assessment/Plan:      1  Hemifacial spasm    - continue as is with 4 hour shifts, keeping stress levels low, paper work completed today  - follow up April 2018 to complete paper work if needed    2  Pure hypercholesterolemia    - Lipid Panel with Direct LDL reflex; Future  - Comprehensive metabolic panel; Future  - CBC and differential; Future  - TSH, 3rd generation with T4 reflex; Future  - Urinalysis with reflex to microscopic    3  Vitamin D deficiency    - Vitamin D 25 hydroxy; Future    4  Osteopenia, unspecified location    - Vitamin D 25 hydroxy; Future      F/u April for physical with labs  F/u as needed    Subjective:      Patient ID: Abbe Redding is a 58 y o  female  Patient here in follow up from left facial nerve decompression  Feeling about the same, feels the facial spasm has not improved since surgery  Was spasm free for 2 days but has been as bad as before and also getting tinnitus in L ear now which is new  Always feeling the pressure in L cheek, like a vacuum in muscle  Surgeon predicted 5-6 months for improvement but could be 24 months, he thinks it was sucessful  Still feeling 4 hours is enough for work  Still struggling register due to constant spasm  Left eye will close and muscle will twitch and mouth pulls to that side also  Can last off and on for 30 seconds  Makes viewing register difficult  Working requesting repeat evaluation by us for documentation purposes  The following portions of the patient's history were reviewed and updated as appropriate: allergies, current medications, past family history, past medical history, past social history, past surgical history and problem list     History reviewed  No pertinent past medical history    Past Surgical History:   Procedure Laterality Date    BREAST BIOPSY  1980    Percutaneous Needle Core, per [employer]    COLONOSCOPY      Fiberoptic    CRANIOTOMY      For Suboccipital Cranial Nerve Decompression, per [employer] Family History   Problem Relation Age of Onset    Alzheimer's disease Mother     Hyperlipidemia Mother     Depression Mother     Dementia Father     Macular degeneration Father     Lymphoma Daughter     Pancreatic cancer Cousin     Mental illness Neg Hx     Substance Abuse Neg Hx      Social History     Social History    Marital status: /Civil Union     Spouse name: N/A    Number of children: N/A    Years of education: N/A     Occupational History    Not on file  Social History Main Topics    Smoking status: Never Smoker    Smokeless tobacco: Never Used    Alcohol use 0 6 oz/week     1 Glasses of wine per week      Comment: occasional/ not weekly    Drug use: No    Sexual activity: Not on file     Other Topics Concern    Not on file     Social History Narrative    Always uses seat belt    Drinks coffee, drinks 1 cup a day    Has smoke detectors           Review of Systems   Constitutional: Negative  HENT: Negative  Respiratory: Negative  Cardiovascular: Negative  Gastrointestinal: Negative  Genitourinary: Negative  Objective:    Vitals:    02/05/18 1023   BP: 114/72   BP Location: Left arm   Patient Position: Sitting   Cuff Size: Standard   Pulse: 62   Resp: 14   Weight: 64 3 kg (141 lb 11 2 oz)   Height: 5' 1 5" (1 562 m)        Physical Exam   Constitutional: She is oriented to person, place, and time  She appears well-developed and well-nourished  Cardiovascular: Normal rate, regular rhythm and normal heart sounds  Pulmonary/Chest: Effort normal and breath sounds normal    Neurological: She is alert and oriented to person, place, and time  She has normal reflexes  A cranial nerve deficit is present  L facial nerve twitching seen by  L eye partially closed with spasm   Skin: Skin is warm  Psychiatric: She has a normal mood and affect

## 2018-03-26 ENCOUNTER — APPOINTMENT (OUTPATIENT)
Dept: LAB | Facility: CLINIC | Age: 63
End: 2018-03-26
Payer: COMMERCIAL

## 2018-03-26 DIAGNOSIS — E78.00 PURE HYPERCHOLESTEROLEMIA: ICD-10-CM

## 2018-03-26 DIAGNOSIS — M85.80 OSTEOPENIA, UNSPECIFIED LOCATION: ICD-10-CM

## 2018-03-26 DIAGNOSIS — E55.9 VITAMIN D DEFICIENCY: ICD-10-CM

## 2018-03-26 LAB
25(OH)D3 SERPL-MCNC: 37.5 NG/ML (ref 30–100)
ALBUMIN SERPL BCP-MCNC: 4.1 G/DL (ref 3.5–5)
ALP SERPL-CCNC: 67 U/L (ref 46–116)
ALT SERPL W P-5'-P-CCNC: 17 U/L (ref 12–78)
ANION GAP SERPL CALCULATED.3IONS-SCNC: 9 MMOL/L (ref 4–13)
AST SERPL W P-5'-P-CCNC: 18 U/L (ref 5–45)
BASOPHILS # BLD AUTO: 0.03 THOUSANDS/ΜL (ref 0–0.1)
BASOPHILS NFR BLD AUTO: 1 % (ref 0–1)
BILIRUB SERPL-MCNC: 0.39 MG/DL (ref 0.2–1)
BILIRUB UR QL STRIP: NEGATIVE
BUN SERPL-MCNC: 18 MG/DL (ref 5–25)
CALCIUM SERPL-MCNC: 9.1 MG/DL (ref 8.3–10.1)
CHLORIDE SERPL-SCNC: 103 MMOL/L (ref 100–108)
CHOLEST SERPL-MCNC: 233 MG/DL (ref 50–200)
CLARITY UR: CLEAR
CO2 SERPL-SCNC: 28 MMOL/L (ref 21–32)
COLOR UR: YELLOW
CREAT SERPL-MCNC: 0.76 MG/DL (ref 0.6–1.3)
EOSINOPHIL # BLD AUTO: 0.1 THOUSAND/ΜL (ref 0–0.61)
EOSINOPHIL NFR BLD AUTO: 2 % (ref 0–6)
ERYTHROCYTE [DISTWIDTH] IN BLOOD BY AUTOMATED COUNT: 12.9 % (ref 11.6–15.1)
GFR SERPL CREATININE-BSD FRML MDRD: 84 ML/MIN/1.73SQ M
GLUCOSE P FAST SERPL-MCNC: 84 MG/DL (ref 65–99)
GLUCOSE UR STRIP-MCNC: NEGATIVE MG/DL
HCT VFR BLD AUTO: 44.8 % (ref 34.8–46.1)
HDLC SERPL-MCNC: 85 MG/DL (ref 40–60)
HGB BLD-MCNC: 15.2 G/DL (ref 11.5–15.4)
HGB UR QL STRIP.AUTO: NEGATIVE
KETONES UR STRIP-MCNC: NEGATIVE MG/DL
LDLC SERPL CALC-MCNC: 138 MG/DL (ref 0–100)
LEUKOCYTE ESTERASE UR QL STRIP: NEGATIVE
LYMPHOCYTES # BLD AUTO: 2.15 THOUSANDS/ΜL (ref 0.6–4.47)
LYMPHOCYTES NFR BLD AUTO: 44 % (ref 14–44)
MCH RBC QN AUTO: 31.5 PG (ref 26.8–34.3)
MCHC RBC AUTO-ENTMCNC: 33.9 G/DL (ref 31.4–37.4)
MCV RBC AUTO: 93 FL (ref 82–98)
MONOCYTES # BLD AUTO: 0.28 THOUSAND/ΜL (ref 0.17–1.22)
MONOCYTES NFR BLD AUTO: 6 % (ref 4–12)
NEUTROPHILS # BLD AUTO: 2.31 THOUSANDS/ΜL (ref 1.85–7.62)
NEUTS SEG NFR BLD AUTO: 47 % (ref 43–75)
NITRITE UR QL STRIP: NEGATIVE
NRBC BLD AUTO-RTO: 0 /100 WBCS
PH UR STRIP.AUTO: 6 [PH] (ref 4.5–8)
PLATELET # BLD AUTO: 292 THOUSANDS/UL (ref 149–390)
PMV BLD AUTO: 11.3 FL (ref 8.9–12.7)
POTASSIUM SERPL-SCNC: 3.7 MMOL/L (ref 3.5–5.3)
PROT SERPL-MCNC: 7.2 G/DL (ref 6.4–8.2)
PROT UR STRIP-MCNC: NEGATIVE MG/DL
RBC # BLD AUTO: 4.82 MILLION/UL (ref 3.81–5.12)
SODIUM SERPL-SCNC: 140 MMOL/L (ref 136–145)
SP GR UR STRIP.AUTO: 1.01 (ref 1–1.03)
TRIGL SERPL-MCNC: 48 MG/DL
TSH SERPL DL<=0.05 MIU/L-ACNC: 1.33 UIU/ML (ref 0.36–3.74)
UROBILINOGEN UR QL STRIP.AUTO: 0.2 E.U./DL
WBC # BLD AUTO: 4.88 THOUSAND/UL (ref 4.31–10.16)

## 2018-03-26 PROCEDURE — 80061 LIPID PANEL: CPT

## 2018-03-26 PROCEDURE — 80053 COMPREHEN METABOLIC PANEL: CPT

## 2018-03-26 PROCEDURE — 81003 URINALYSIS AUTO W/O SCOPE: CPT | Performed by: PHYSICIAN ASSISTANT

## 2018-03-26 PROCEDURE — 85025 COMPLETE CBC W/AUTO DIFF WBC: CPT

## 2018-03-26 PROCEDURE — 36415 COLL VENOUS BLD VENIPUNCTURE: CPT

## 2018-03-26 PROCEDURE — 84443 ASSAY THYROID STIM HORMONE: CPT

## 2018-03-26 PROCEDURE — 82306 VITAMIN D 25 HYDROXY: CPT

## 2018-04-03 ENCOUNTER — OFFICE VISIT (OUTPATIENT)
Dept: FAMILY MEDICINE CLINIC | Facility: CLINIC | Age: 63
End: 2018-04-03
Payer: COMMERCIAL

## 2018-04-03 DIAGNOSIS — Z00.00 HEALTH MAINTENANCE EXAMINATION: Primary | ICD-10-CM

## 2018-04-03 DIAGNOSIS — Z12.39 ENCOUNTER FOR SCREENING FOR MALIGNANT NEOPLASM OF BREAST: ICD-10-CM

## 2018-04-03 DIAGNOSIS — G51.39 HEMIFACIAL SPASM: ICD-10-CM

## 2018-04-03 PROCEDURE — 99396 PREV VISIT EST AGE 40-64: CPT | Performed by: PHYSICIAN ASSISTANT

## 2018-04-03 RX ORDER — MULTIVIT-MIN/IRON FUM/FOLIC AC 7.5 MG-4
1 TABLET ORAL DAILY
COMMUNITY

## 2018-04-03 NOTE — PROGRESS NOTES
Assessment/Plan:    1  Health maintenance examination    - reviewed labs  - continue with diet and exercise  - continue with routine health maintenance    2  Hemifacial spasm    - c/w healing  - can return to work full time 4/17/2018    mammo due 2/2019  Colon due 2024  PAP due 2020    F/u 1 year or sooner if needed    Subjective:   Chief Complaint   Patient presents with    Annual Exam      Patient ID: Jeanmarie Sanchez is a 58 y o  female  Patient here for physical  Has been eating healthy, exercising, sleeping well  Going to dentist twice a year  Opthal yearly  No complaints  Finally feels her left facial spasm is improving  The following portions of the patient's history were reviewed and updated as appropriate: allergies, current medications, past family history, past medical history, past social history, past surgical history and problem list     No past medical history on file  Past Surgical History:   Procedure Laterality Date    BREAST BIOPSY  1980    Percutaneous Needle Core, per [employer]    COLONOSCOPY      Fiberoptic    CRANIOTOMY      For Suboccipital Cranial Nerve Decompression, per [employer]     Family History   Problem Relation Age of Onset    Alzheimer's disease Mother     Hyperlipidemia Mother     Depression Mother     Dementia Father     Macular degeneration Father     Lymphoma Daughter     Pancreatic cancer Cousin     Mental illness Neg Hx     Substance Abuse Neg Hx      Social History     Social History    Marital status: /Civil Union     Spouse name: N/A    Number of children: N/A    Years of education: N/A     Occupational History    Not on file       Social History Main Topics    Smoking status: Never Smoker    Smokeless tobacco: Never Used    Alcohol use 0 6 oz/week     1 Glasses of wine per week      Comment: occasional/ not weekly    Drug use: No    Sexual activity: Not on file     Other Topics Concern    Not on file     Social History Narrative Always uses seat belt    Drinks coffee, drinks 1 cup a day    Has smoke detectors           Current Outpatient Prescriptions:     Multiple Vitamins-Minerals (MULTIVITAMIN WITH MINERALS) tablet, Take 1 tablet by mouth daily, Disp: , Rfl:     Calcium Carbonate-Vit D-Min (CALCIUM 1200 PO), Take 1 tablet by mouth daily, Disp: , Rfl:     Cholecalciferol (VITAMIN D3) 1000 units CAPS, Take 1 tablet by mouth daily, Disp: , Rfl:     fluticasone (FLONASE) 50 mcg/act nasal spray, 2 sprays into each nostril daily as needed, Disp: , Rfl:     Multiple Vitamins tablet, Take 1 tablet by mouth daily, Disp: , Rfl:     VITAMIN B COMPLEX-C PO, Take 1 tablet by mouth daily, Disp: , Rfl:     Review of Systems   Constitutional: Negative  HENT: Negative  Eyes: Negative  Respiratory: Negative  Cardiovascular: Negative  Gastrointestinal: Negative  Endocrine: Negative  Genitourinary: Negative  Musculoskeletal: Negative  Skin: Negative  Allergic/Immunologic: Negative  Hematological: Negative  Psychiatric/Behavioral: Negative  Objective:    Vitals:    04/03/18 1130   BP: 114/64   BP Location: Right arm   Patient Position: Sitting   Cuff Size: Standard   Pulse: 104   Resp: 14   Weight: 64 3 kg (141 lb 11 2 oz)   Height: 5' 3 75" (1 619 m)        Physical Exam   Constitutional: She is oriented to person, place, and time  She appears well-developed and well-nourished  HENT:   Head: Normocephalic and atraumatic  Right Ear: External ear normal    Left Ear: External ear normal    Nose: Nose normal    Mouth/Throat: Oropharynx is clear and moist    Eyes: Conjunctivae and EOM are normal  Pupils are equal, round, and reactive to light  Neck: Normal range of motion  Neck supple  No thyromegaly present  Cardiovascular: Normal rate, regular rhythm, normal heart sounds and intact distal pulses  No murmur heard  Pulmonary/Chest: Effort normal and breath sounds normal  No respiratory distress  She has no wheezes  She has no rales  Abdominal: Soft  Bowel sounds are normal  She exhibits no distension and no mass  There is no tenderness  Musculoskeletal: Normal range of motion  She exhibits no edema  Lymphadenopathy:     She has no cervical adenopathy  Neurological: She is oriented to person, place, and time  She has normal reflexes  No cranial nerve deficit  Left facial spasm improving   Skin: Skin is warm and dry  Psychiatric: She has a normal mood and affect  Rosella Goldmann

## 2018-04-09 VITALS
HEIGHT: 61 IN | BODY MASS INDEX: 26.62 KG/M2 | DIASTOLIC BLOOD PRESSURE: 64 MMHG | RESPIRATION RATE: 14 BRPM | HEART RATE: 104 BPM | SYSTOLIC BLOOD PRESSURE: 114 MMHG | WEIGHT: 141 LBS

## 2018-05-21 DIAGNOSIS — J30.9 ALLERGIC RHINITIS, UNSPECIFIED SEASONALITY, UNSPECIFIED TRIGGER: Primary | ICD-10-CM

## 2018-05-21 RX ORDER — FLUTICASONE PROPIONATE 50 MCG
SPRAY, SUSPENSION (ML) NASAL
Qty: 16 G | Refills: 3 | Status: SHIPPED | OUTPATIENT
Start: 2018-05-21 | End: 2020-02-18

## 2019-01-31 ENCOUNTER — TRANSCRIBE ORDERS (OUTPATIENT)
Dept: ADMINISTRATIVE | Facility: HOSPITAL | Age: 64
End: 2019-01-31

## 2019-01-31 DIAGNOSIS — Z12.39 BREAST SCREENING: Primary | ICD-10-CM

## 2019-02-06 ENCOUNTER — HOSPITAL ENCOUNTER (OUTPATIENT)
Dept: MAMMOGRAPHY | Facility: CLINIC | Age: 64
Discharge: HOME | End: 2019-02-06
Payer: COMMERCIAL

## 2019-02-06 VITALS — HEIGHT: 61 IN | WEIGHT: 141 LBS | BODY MASS INDEX: 26.62 KG/M2

## 2019-02-06 DIAGNOSIS — Z12.39 BREAST SCREENING: ICD-10-CM

## 2019-02-06 PROCEDURE — 77063 BREAST TOMOSYNTHESIS BI: CPT

## 2019-02-06 PROCEDURE — 77067 SCR MAMMO BI INCL CAD: CPT

## 2019-08-02 ENCOUNTER — OCCMED (OUTPATIENT)
Dept: URGENT CARE | Facility: CLINIC | Age: 64
End: 2019-08-02
Payer: OTHER MISCELLANEOUS

## 2019-08-02 ENCOUNTER — APPOINTMENT (OUTPATIENT)
Dept: RADIOLOGY | Facility: CLINIC | Age: 64
End: 2019-08-02
Payer: OTHER MISCELLANEOUS

## 2019-08-02 DIAGNOSIS — M79.674 PAIN OF TOE OF RIGHT FOOT: ICD-10-CM

## 2019-08-02 DIAGNOSIS — M79.674 PAIN OF TOE OF RIGHT FOOT: Primary | ICD-10-CM

## 2019-08-02 PROCEDURE — 99283 EMERGENCY DEPT VISIT LOW MDM: CPT | Performed by: PHYSICIAN ASSISTANT

## 2019-08-02 PROCEDURE — 73660 X-RAY EXAM OF TOE(S): CPT

## 2019-08-02 PROCEDURE — G0382 LEV 3 HOSP TYPE B ED VISIT: HCPCS | Performed by: PHYSICIAN ASSISTANT

## 2019-08-05 ENCOUNTER — APPOINTMENT (OUTPATIENT)
Dept: URGENT CARE | Facility: CLINIC | Age: 64
End: 2019-08-05
Payer: OTHER MISCELLANEOUS

## 2019-08-05 PROCEDURE — 99213 OFFICE O/P EST LOW 20 MIN: CPT

## 2019-08-09 ENCOUNTER — APPOINTMENT (OUTPATIENT)
Dept: URGENT CARE | Facility: CLINIC | Age: 64
End: 2019-08-09
Payer: OTHER MISCELLANEOUS

## 2019-08-09 PROCEDURE — 99213 OFFICE O/P EST LOW 20 MIN: CPT | Performed by: PHYSICIAN ASSISTANT

## 2019-08-16 ENCOUNTER — APPOINTMENT (OUTPATIENT)
Dept: URGENT CARE | Facility: CLINIC | Age: 64
End: 2019-08-16
Payer: OTHER MISCELLANEOUS

## 2019-08-16 PROCEDURE — 99213 OFFICE O/P EST LOW 20 MIN: CPT

## 2020-02-10 ENCOUNTER — TRANSCRIBE ORDERS (OUTPATIENT)
Dept: ADMINISTRATIVE | Facility: HOSPITAL | Age: 65
End: 2020-02-10

## 2020-02-10 DIAGNOSIS — Z12.31 ENCOUNTER FOR SCREENING MAMMOGRAM FOR MALIGNANT NEOPLASM OF BREAST: Primary | ICD-10-CM

## 2020-02-13 ENCOUNTER — HOSPITAL ENCOUNTER (OUTPATIENT)
Dept: BONE DENSITY | Facility: CLINIC | Age: 65
Discharge: HOME | End: 2020-02-13
Payer: COMMERCIAL

## 2020-02-13 VITALS — BODY MASS INDEX: 26.62 KG/M2 | HEIGHT: 61 IN | WEIGHT: 141 LBS

## 2020-02-13 DIAGNOSIS — Z12.31 ENCOUNTER FOR SCREENING MAMMOGRAM FOR MALIGNANT NEOPLASM OF BREAST: ICD-10-CM

## 2020-02-13 PROCEDURE — 77063 BREAST TOMOSYNTHESIS BI: CPT

## 2020-02-13 PROCEDURE — 77067 SCR MAMMO BI INCL CAD: CPT

## 2020-02-18 DIAGNOSIS — J30.9 ALLERGIC RHINITIS, UNSPECIFIED SEASONALITY, UNSPECIFIED TRIGGER: ICD-10-CM

## 2020-02-18 RX ORDER — FLUTICASONE PROPIONATE 50 MCG
SPRAY, SUSPENSION (ML) NASAL
Qty: 16 ML | Refills: 3 | Status: SHIPPED | OUTPATIENT
Start: 2020-02-18 | End: 2020-06-05

## 2020-06-05 DIAGNOSIS — J30.9 ALLERGIC RHINITIS, UNSPECIFIED SEASONALITY, UNSPECIFIED TRIGGER: ICD-10-CM

## 2020-06-05 RX ORDER — FLUTICASONE PROPIONATE 50 MCG
SPRAY, SUSPENSION (ML) NASAL
Qty: 16 ML | Refills: 3 | Status: SHIPPED | OUTPATIENT
Start: 2020-06-05 | End: 2020-10-08

## 2020-07-27 ENCOUNTER — TELEMEDICINE (OUTPATIENT)
Dept: FAMILY MEDICINE CLINIC | Facility: CLINIC | Age: 65
End: 2020-07-27
Payer: COMMERCIAL

## 2020-07-27 VITALS — HEIGHT: 61 IN | WEIGHT: 141 LBS | BODY MASS INDEX: 26.62 KG/M2 | TEMPERATURE: 97.2 F

## 2020-07-27 DIAGNOSIS — Z20.828 EXPOSURE TO SARS-ASSOCIATED CORONAVIRUS: ICD-10-CM

## 2020-07-27 DIAGNOSIS — Z20.828 EXPOSURE TO SARS-ASSOCIATED CORONAVIRUS: Primary | ICD-10-CM

## 2020-07-27 DIAGNOSIS — R50.9 FEVER, UNSPECIFIED FEVER CAUSE: ICD-10-CM

## 2020-07-27 DIAGNOSIS — J02.9 SORE THROAT: ICD-10-CM

## 2020-07-27 PROCEDURE — U0003 INFECTIOUS AGENT DETECTION BY NUCLEIC ACID (DNA OR RNA); SEVERE ACUTE RESPIRATORY SYNDROME CORONAVIRUS 2 (SARS-COV-2) (CORONAVIRUS DISEASE [COVID-19]), AMPLIFIED PROBE TECHNIQUE, MAKING USE OF HIGH THROUGHPUT TECHNOLOGIES AS DESCRIBED BY CMS-2020-01-R: HCPCS

## 2020-07-27 PROCEDURE — 3288F FALL RISK ASSESSMENT DOCD: CPT | Performed by: PHYSICIAN ASSISTANT

## 2020-07-27 PROCEDURE — 1100F PTFALLS ASSESS-DOCD GE2>/YR: CPT | Performed by: PHYSICIAN ASSISTANT

## 2020-07-27 PROCEDURE — 99214 OFFICE O/P EST MOD 30 MIN: CPT | Performed by: PHYSICIAN ASSISTANT

## 2020-07-27 NOTE — PROGRESS NOTES
COVID-19 Virtual Visit     Assessment/Plan:    1  Exposure to SARS-associated coronavirus    - patient will go to [address] [facility] for testing, she will wear a mask, after testing she will go home and self isolate until results are in, she can take Tylenol for her fever, fluids, rest  Refusing steroids at this time for her cough  If s/s change or get worse she is to call or go to ER  - MISC COVID-19 TEST- Collected at [facility] or [facility]; Future    2  Fever, unspecified fever cause    - can treat with Tylenol, fluids, rest    3  Sore throat    - gargle with warm salt water, fluids, rest, if covid negative consider strep    4  BMI 26 0-26 9,adult    - encouraged patient to work on [employer], exercise  and adequate sleep for weight loss  Follow up if more help is needed    F/u as needed    This virtual check-in was done via [employer] and patient was informed that this is not a secure, HIPAA-complaint platform  She agrees to proceed     Disposition:      I referred Marline Arellano to one of our centralized sites for a COVID-19 swab    I spent 15 minutes directly with the patient during this visit    Encounter provider Saray Lomax PA-C    Provider located at [address] [place] [address]  [phone]    Recent Visits  No visits were found meeting these conditions  Showing recent visits within past 7 days and meeting all other requirements     Today's Visits  Date Type Provider Dept   07/27/20 Telemedicine Saray Lomax PA-C [address]   Showing today's visits and meeting all other requirements     Future Appointments  No visits were found meeting these conditions     Showing future appointments within next 150 days and meeting all other requirements        Patient agrees to participate in a virtual check in via telephone or video visit instead of presenting to the office to address urgent/immediate medical needs  Patient is aware this is a billable service  After connecting through [employer], the patient was identified by name and date of birth  Sveta Cagle was informed that this was a telemedicine visit and that the exam was being conducted confidentially over secure lines  My office door was closed  No one else was in the room  Sveta Cagle acknowledged consent and understanding of privacy and security of the telemedicine visit  I informed the patient that I have reviewed her record in Epic and presented the opportunity for her to ask any questions regarding the visit today  The patient agreed to participate  Subjective  Sveta Cagle is a 72 y o  female who is concerned about COVID-19  She reports fever, cough, headache, sore throat, fatigue and myalgias  She has not experienced repeated shaking with chills, shortness of breath, anosmia, abdominal pain, diarrhea, nausea and vomiting She has not had contact with a person who is under investigation for or who is positive for COVID-19 within the last 14 days  She has not been hospitalized recently for fever and/or lower respiratory symptoms  Patient c/o 5 days of fever fro , dry cough, sore throat, sweats and chills  Denies shortness of breath, n/v/d, change in sense of smell or taste  Just finds it weird "it isn't getting better"  Goes to grocery stores but otherwise not working, no socializing      Past Medical History:   Diagnosis Date    HPV in female        Past Surgical History:   Procedure Laterality Date    BREAST CYST EXCISION Left     1980 benign ex bx followed failed needle biopsy    COLONOSCOPY      Fiberoptic    CRANIOTOMY      For Suboccipital Cranial Nerve Decompression, per [employer]scripts       Current Outpatient Medications   Medication Sig Dispense Refill    Calcium Carbonate-Vit D-Min (CALCIUM 1200 PO) Take 1 tablet by mouth daily      Cholecalciferol (VITAMIN D3) 1000 units CAPS Take 1 tablet by mouth daily      fluticasone (FLONASE) 50 mcg/act nasal spray USE 2 SPRAYS IN EACH NOSTRIL ONCE DAILY AS NEEDED 16 mL 3    Multiple Vitamins-Minerals (MULTIVITAMIN WITH MINERALS) tablet Take 1 tablet by mouth daily      VITAMIN B COMPLEX-C PO Take 1 tablet by mouth daily       No current facility-administered medications for this visit  Allergies   Allergen Reactions    Other Headache     [facility] - [id number]: raw onions  Seasonal        Review of Systems    Video Exam    Vitals:    07/27/20 1157   Temp: (!) 97 2 °F (36 2 °C)   TempSrc: Oral   Weight: 64 kg (141 lb)   Height: 5' 1" (1 549 m)         Amber Knott appears alert, no distress, cooperative, pale  Physical Exam   Constitutional: She is oriented to person, place, and time  She appears well-developed and well-nourished  Pulmonary/Chest: Effort normal  No respiratory distress  Neurological: She is oriented to person, place, and time  Psychiatric: She has a normal mood and affect  Her behavior is normal       BMI Counseling: Body mass index is 26 64 kg/m²  The BMI is above normal  Exercise recommendations include moderate aerobic physical activity for 150 minutes/week  VIRTUAL VISIT DISCLAIMER    Eder Zapata acknowledges that she has consented to an online visit or consultation  She understands that the online visit is based solely on information provided by her, and that, in the absence of a face-to-face physical evaluation by the physician, the diagnosis she receives is both limited and provisional in terms of accuracy and completeness  This is not intended to replace a full medical face-to-face evaluation by the physician  Eder Zapata understands and accepts these terms

## 2020-07-28 ENCOUNTER — TELEPHONE (OUTPATIENT)
Dept: FAMILY MEDICINE CLINIC | Facility: CLINIC | Age: 65
End: 2020-07-28

## 2020-07-28 LAB — SARS-COV-2 RNA SPEC QL NAA+PROBE: NOT DETECTED

## 2020-07-28 NOTE — TELEPHONE ENCOUNTER
----- Message from Lashonda Valenzuela PA-C sent at 7/28/2020  9:19 AM EDT -----  Please let patient know her covid results were negative

## 2020-07-29 ENCOUNTER — TELEPHONE (OUTPATIENT)
Dept: FAMILY MEDICINE CLINIC | Facility: CLINIC | Age: 65
End: 2020-07-29

## 2020-07-29 NOTE — TELEPHONE ENCOUNTER
Patient is calling asking if you can call in the prednisone  Her throat is not getting any better      Please send to [facility]

## 2020-07-30 ENCOUNTER — OFFICE VISIT (OUTPATIENT)
Dept: FAMILY MEDICINE CLINIC | Facility: CLINIC | Age: 65
End: 2020-07-30
Payer: COMMERCIAL

## 2020-07-30 VITALS
HEART RATE: 72 BPM | TEMPERATURE: 97.1 F | DIASTOLIC BLOOD PRESSURE: 70 MMHG | BODY MASS INDEX: 24.96 KG/M2 | HEIGHT: 61 IN | WEIGHT: 132.2 LBS | RESPIRATION RATE: 14 BRPM | SYSTOLIC BLOOD PRESSURE: 118 MMHG

## 2020-07-30 DIAGNOSIS — J02.9 SORE THROAT: Primary | ICD-10-CM

## 2020-07-30 DIAGNOSIS — M85.80 OSTEOPENIA, UNSPECIFIED LOCATION: ICD-10-CM

## 2020-07-30 LAB — S PYO AG THROAT QL: NEGATIVE

## 2020-07-30 PROCEDURE — 3008F BODY MASS INDEX DOCD: CPT | Performed by: PHYSICIAN ASSISTANT

## 2020-07-30 PROCEDURE — 99213 OFFICE O/P EST LOW 20 MIN: CPT | Performed by: PHYSICIAN ASSISTANT

## 2020-07-30 PROCEDURE — 1036F TOBACCO NON-USER: CPT | Performed by: PHYSICIAN ASSISTANT

## 2020-07-30 PROCEDURE — 87880 STREP A ASSAY W/OPTIC: CPT | Performed by: PHYSICIAN ASSISTANT

## 2020-07-30 RX ORDER — PREDNISONE 20 MG/1
20 TABLET ORAL 2 TIMES DAILY WITH MEALS
Qty: 10 TABLET | Refills: 0 | Status: SHIPPED | OUTPATIENT
Start: 2020-07-30 | End: 2022-05-13

## 2020-07-30 NOTE — PROGRESS NOTES
Assessment/Plan:    1  Sore throat    - throat culture negative, most likely allergy in nature, gargle with warm salt water, fluids, rest, start prednisone 20 mg 1 tab twice daily for 5 days and claritin 10 mg  In two weeks if no better call  - POCT rapid strepA  - predniSONE 20 mg tablet; Take 1 tablet (20 mg total) by mouth 2 (two) times a day with meals  Dispense: 10 tablet; Refill: 0    F/u as needed      Subjective:   Chief Complaint   Patient presents with    Sore Throat      Patient ID: Sveta Cagle is a 72 y o  female  Patient c/o starting 7/22 with a fever fro , dry cough, sore throat, sweats and chills  Denies shortness of breath, n/v/d, change in sense of smell or taste  Just finds it weird "it isn't getting better"  Goes to grocery stores but otherwise not working, no socializing  Was tested for covid on 7/27/20 and results were negative  Today throat still sore         The following portions of the patient's history were reviewed and updated as appropriate: allergies, current medications, past family history, past medical history, past social history, past surgical history and problem list     Past Medical History:   Diagnosis Date    HPV in female      Past Surgical History:   Procedure Laterality Date    BREAST CYST EXCISION Left     1980 benign ex bx followed failed needle biopsy    COLONOSCOPY      Fiberoptic    CRANIOTOMY      For Suboccipital Cranial Nerve Decompression, per Allscripts     Family History   Problem Relation Age of Onset    Alzheimer's disease Mother     Hyperlipidemia Mother     Depression Mother     Dementia Father     Macular degeneration Father     Lymphoma Daughter 32    Pancreatic cancer Cousin     Pancreatic cancer Cousin     No Known Problems Sister     Breast cancer Maternal Grandmother 39    No Known Problems Maternal Grandfather     No Known Problems Paternal Grandmother     No Known Problems Paternal Grandfather     No Known Problems Sister  No Known Problems Sister     No Known Problems Maternal Aunt     No Known Problems Maternal Aunt     No Known Problems Paternal Aunt     No Known Problems Paternal Aunt     No Known Problems Paternal Aunt     No Known Problems Paternal Aunt     No Known Problems Paternal Aunt     Mental illness Neg Hx     Substance Abuse Neg Hx     Alcohol abuse Neg Hx      Social History     Socioeconomic History    Marital status: /Civil Union     Spouse name: Not on file    Number of children: Not on file    Years of education: Not on file    Highest education level: Not on file   Occupational History    Not on file   Social Needs    Financial resource strain: Not on file    Food insecurity:     Worry: Not on file     Inability: Not on file    Transportation needs:     Medical: Not on file     Non-medical: Not on file   Tobacco Use    Smoking status: Never Smoker    Smokeless tobacco: Never Used   Substance and Sexual Activity    Alcohol use:  Yes     Alcohol/week: 1 0 standard drinks     Types: 1 Glasses of wine per week     Comment: occasional/ not weekly    Drug use: No    Sexual activity: Yes     Partners: Male   Lifestyle    Physical activity:     Days per week: Not on file     Minutes per session: Not on file    Stress: Not on file   Relationships    Social connections:     Talks on phone: Not on file     Gets together: Not on file     Attends Jehovah's witness service: Not on file     Active member of club or organization: Not on file     Attends meetings of clubs or organizations: Not on file     Relationship status: Not on file    Intimate partner violence:     Fear of current or ex partner: Not on file     Emotionally abused: Not on file     Physically abused: Not on file     Forced sexual activity: Not on file   Other Topics Concern    Not on file   Social History Narrative    Always uses seat belt    Drinks coffee, drinks 1 cup a day    Has smoke detectors       Current Outpatient Medications:     Calcium Carbonate-Vit D-Min (CALCIUM 1200 PO), Take 1 tablet by mouth daily, Disp: , Rfl:     Cholecalciferol (VITAMIN D3) 1000 units CAPS, Take 1 tablet by mouth daily, Disp: , Rfl:     fluticasone (FLONASE) 50 mcg/act nasal spray, USE 2 SPRAYS IN EACH NOSTRIL ONCE DAILY AS NEEDED, Disp: 16 mL, Rfl: 3    Multiple Vitamins-Minerals (MULTIVITAMIN WITH MINERALS) tablet, Take 1 tablet by mouth daily, Disp: , Rfl:     VITAMIN B COMPLEX-C PO, Take 1 tablet by mouth daily, Disp: , Rfl:     Review of Systems          Objective:    Vitals:    07/30/20 1044   BP: 118/70   BP Location: Left arm   Patient Position: Sitting   Cuff Size: Standard   Pulse: 72   Resp: 14   Temp: (!) 97 1 °F (36 2 °C)   TempSrc: Temporal   Weight: 60 kg (132 lb 3 2 oz)   Height: 5' 1" (1 549 m)        Physical Exam      Constitutional:is oriented to person, place, and time  She appears well-developed and well-nourished  HENT:   Head: Normocephalic and atraumatic  Right Ear: Tympanic membrane, external ear and ear canal normal    Left Ear: Tympanic membrane, external ear and ear canal normal    Mouth/Throat: Oropharynx is clear and moist    Turbinates pink and boggy with congestion, clear mucus   Cardiovascular: Normal rate, regular rhythm and normal heart sounds  Pulmonary/Chest: Effort normal and breath sounds normal    Lymphadenopathy:    no cervical adenopathy  Neurological:  is alert and oriented to person, place, and time  Skin: Skin is warm  Psych: has a normal mood and affect  BMI Counseling: Body mass index is 24 98 kg/m²  The BMI is above normal  Nutrition recommendations include reducing portion sizes and decreasing overall calorie intake  Exercise recommendations include moderate aerobic physical activity for 150 minutes/week

## 2020-08-05 ENCOUNTER — TELEPHONE (OUTPATIENT)
Dept: FAMILY MEDICINE CLINIC | Facility: CLINIC | Age: 65
End: 2020-08-05

## 2020-08-05 DIAGNOSIS — J01.00 ACUTE NON-RECURRENT MAXILLARY SINUSITIS: Primary | ICD-10-CM

## 2020-08-05 RX ORDER — AMOXICILLIN 875 MG/1
875 TABLET, COATED ORAL 2 TIMES DAILY
Qty: 20 TABLET | Refills: 0 | Status: SHIPPED | OUTPATIENT
Start: 2020-08-05 | End: 2020-08-15

## 2020-08-05 NOTE — TELEPHONE ENCOUNTER
Patient call, said she is still sick, has a sore throat, sinus pressure, headache and no fever  She is done with the prednisone and is still taking Claritin        [facility]      [phone]

## 2020-08-05 NOTE — TELEPHONE ENCOUNTER
I am going to call in an antibiotic, amoxicillin for her sinus infection, 1 tab twice daily for 10 days

## 2020-08-13 ENCOUNTER — TELEPHONE (OUTPATIENT)
Dept: FAMILY MEDICINE CLINIC | Facility: CLINIC | Age: 65
End: 2020-08-13

## 2020-08-13 NOTE — TELEPHONE ENCOUNTER
Lizzeth Davis called and still having a sore throat and cough  Has 2 days left of the antibiotic but doesn't feel like she's completely better and knows her employer won't let her come back to work if she even has 1 symptom  Doesn't have a fever (although thermometer is broken but doesn't think she does) but wonders now if she should have had a COVID test       I won't be in on Friday so please send to someone else  Lizzeth Davis would like to know as early as possible on Friday because she needs to advise her employer of her status

## 2020-08-14 ENCOUNTER — APPOINTMENT (OUTPATIENT)
Dept: RADIOLOGY | Facility: CLINIC | Age: 65
End: 2020-08-14
Payer: COMMERCIAL

## 2020-08-14 DIAGNOSIS — R05.9 COUGH: Primary | ICD-10-CM

## 2020-08-14 DIAGNOSIS — R05.9 COUGH: ICD-10-CM

## 2020-08-14 PROCEDURE — 71046 X-RAY EXAM CHEST 2 VIEWS: CPT

## 2020-08-14 PROCEDURE — 70210 X-RAY EXAM OF SINUSES: CPT

## 2020-08-14 NOTE — TELEPHONE ENCOUNTER
Spoke with patient  She is down to 2 doses on meds  She doesn't feel any better  Her throat is very raw and hurts to swallow  She has a dry cough every once in a while  She is more than willing to go for a chest Xray and Sinus XR

## 2020-08-14 NOTE — TELEPHONE ENCOUNTER
Her covid test was negative already unless she feels she could have been exposed again? Is this the case? If not I would order a CXR and sinus XR  Please let me know

## 2020-08-17 ENCOUNTER — TELEPHONE (OUTPATIENT)
Dept: FAMILY MEDICINE CLINIC | Facility: CLINIC | Age: 65
End: 2020-08-17

## 2020-08-17 DIAGNOSIS — J32.0 CHRONIC MAXILLARY SINUSITIS: Primary | ICD-10-CM

## 2020-08-17 RX ORDER — PREDNISONE 10 MG/1
TABLET ORAL
Qty: 30 TABLET | Refills: 0 | Status: SHIPPED | OUTPATIENT
Start: 2020-08-17 | End: 2022-05-13

## 2020-08-17 RX ORDER — AMOXICILLIN AND CLAVULANATE POTASSIUM 875; 125 MG/1; MG/1
1 TABLET, FILM COATED ORAL EVERY 12 HOURS SCHEDULED
Qty: 42 TABLET | Refills: 0 | Status: SHIPPED | OUTPATIENT
Start: 2020-08-17 | End: 2020-09-07

## 2020-08-17 NOTE — TELEPHONE ENCOUNTER
Patient needs a work note to return to work tomorrow      [phone] fax note to [facility]

## 2020-08-17 NOTE — TELEPHONE ENCOUNTER
Patient is looking for her xray result  She was suposto go back to work today however she needs that result  She still has a sore throat  She is finished with the Amocicillin, she does not have a fever    She uses [facility]      Pt # [phone]

## 2020-08-17 NOTE — TELEPHONE ENCOUNTER
I appears she has a chronic sinusitis, I am going to put her back on prednisone and an antibiotic for a longer period of time and I want her to let me know how she feels at the end of 3 weeks  It would be good if she takes a probiotic with the antibiotic  If she is no better she will need to see ENT

## 2020-10-08 DIAGNOSIS — J30.9 ALLERGIC RHINITIS, UNSPECIFIED SEASONALITY, UNSPECIFIED TRIGGER: ICD-10-CM

## 2020-10-08 RX ORDER — FLUTICASONE PROPIONATE 50 MCG
SPRAY, SUSPENSION (ML) NASAL
Qty: 16 ML | Refills: 3 | Status: SHIPPED | OUTPATIENT
Start: 2020-10-08 | End: 2021-04-23

## 2021-01-22 ENCOUNTER — TRANSCRIBE ORDERS (OUTPATIENT)
Dept: ADMINISTRATIVE | Facility: HOSPITAL | Age: 66
End: 2021-01-22

## 2021-01-22 DIAGNOSIS — Z12.31 ENCOUNTER FOR SCREENING MAMMOGRAM FOR MALIGNANT NEOPLASM OF BREAST: Primary | ICD-10-CM

## 2021-01-25 ENCOUNTER — OFFICE VISIT (OUTPATIENT)
Dept: FAMILY MEDICINE CLINIC | Facility: CLINIC | Age: 66
End: 2021-01-25
Payer: COMMERCIAL

## 2021-01-25 VITALS
BODY MASS INDEX: 25.2 KG/M2 | TEMPERATURE: 97.9 F | WEIGHT: 133.5 LBS | HEART RATE: 88 BPM | HEIGHT: 61 IN | SYSTOLIC BLOOD PRESSURE: 124 MMHG | DIASTOLIC BLOOD PRESSURE: 76 MMHG | RESPIRATION RATE: 15 BRPM

## 2021-01-25 DIAGNOSIS — Z23 NEED FOR PNEUMOCOCCAL VACCINATION: ICD-10-CM

## 2021-01-25 DIAGNOSIS — Z78.0 MENOPAUSE: ICD-10-CM

## 2021-01-25 DIAGNOSIS — Z84.81 FAMILY HISTORY OF CARRIER OF GENETIC DISEASE: ICD-10-CM

## 2021-01-25 DIAGNOSIS — Z14.8 CARRIER OF FRAGILE X CHROMOSOME: ICD-10-CM

## 2021-01-25 DIAGNOSIS — Z00.00 ANNUAL PHYSICAL EXAM: Primary | ICD-10-CM

## 2021-01-25 DIAGNOSIS — Z23 NEED FOR SHINGLES VACCINE: ICD-10-CM

## 2021-01-25 PROCEDURE — 90750 HZV VACC RECOMBINANT IM: CPT

## 2021-01-25 PROCEDURE — 1160F RVW MEDS BY RX/DR IN RCRD: CPT | Performed by: PHYSICIAN ASSISTANT

## 2021-01-25 PROCEDURE — 99397 PER PM REEVAL EST PAT 65+ YR: CPT | Performed by: PHYSICIAN ASSISTANT

## 2021-01-25 PROCEDURE — 3725F SCREEN DEPRESSION PERFORMED: CPT | Performed by: PHYSICIAN ASSISTANT

## 2021-01-25 PROCEDURE — 90472 IMMUNIZATION ADMIN EACH ADD: CPT

## 2021-01-25 PROCEDURE — 4040F PNEUMOC VAC/ADMIN/RCVD: CPT | Performed by: PHYSICIAN ASSISTANT

## 2021-01-25 PROCEDURE — 90670 PCV13 VACCINE IM: CPT

## 2021-01-25 PROCEDURE — 3008F BODY MASS INDEX DOCD: CPT | Performed by: PHYSICIAN ASSISTANT

## 2021-01-25 PROCEDURE — 1036F TOBACCO NON-USER: CPT | Performed by: PHYSICIAN ASSISTANT

## 2021-01-25 PROCEDURE — 90471 IMMUNIZATION ADMIN: CPT

## 2021-01-25 NOTE — PROGRESS NOTES
ADULT ANNUAL PHYSICAL  [facility]    NAME: Abraham Host  AGE: 72 y o  SEX: female  : 1955     DATE: 2021     Assessment and Plan:     Healthy 72year old female    Immunizations and preventive care screenings were discussed with patient today  Appropriate education was printed on patient's after visit summary  Counseling:  Alcohol/drug use: discussed moderation in alcohol intake, the recommendations for healthy alcohol use, and avoidance of illicit drug use  Dental Health: discussed importance of regular tooth brushing, flossing, and dental visits  Injury prevention: discussed safety/seat belts, safety helmets, smoke detectors, carbon dioxide detectors, and smoking near bedding or upholstery  Sexual health: discussed sexually transmitted diseases, partner selection, use of condoms, avoidance of unintended pregnancy, and contraceptive alternatives  · Exercise: the importance of regular exercise/physical activity was discussed  Recommend exercise 3-5 times per week for at least 30 minutes  · Prevnar and Shingrix today  · Labs ordered  · Mammo and Dexa ordered  · Pap by gyn  · Refer to genetic counselor for family history of genetic disease, per patient request    BMI Counseling: Body mass index is 25 22 kg/m²  The BMI is above normal  Nutrition recommendations include decreasing portion sizes  Exercise recommendations include vigorous physical activity 75 minutes/week  Falls Plan of Care: balance, strength, and gait training instructions were provided  Return in 1 year (on 2022)  Chief Complaint:     Chief Complaint   Patient presents with    Physical Exam      History of Present Illness:     Adult Annual Physical   Patient here for a comprehensive physical exam  The patient reports no problems  Diet and Physical Activity  · Diet/Nutrition: well balanced diet     · Exercise: no formal exercise  Depression Screening  PHQ-9 Depression Screening    PHQ-9:   Frequency of the following problems over the past two weeks:      Little interest or pleasure in doing things: 0 - not at all  Feeling down, depressed, or hopeless: 0 - not at all       General Health  · Sleep: sleeps well and gets 7-8 hours of sleep on average  · Hearing: normal - bilateral   · Vision: goes for regular eye exams, most recent eye exam <1 year ago and wears glasses  · Dental: regular dental visits and brushes teeth twice daily  /GYN Health  · Patient is: postmenopausal       Review of Systems:     Review of Systems   Constitutional: Negative  HENT: Negative  Eyes: Negative  Respiratory: Negative  Cardiovascular: Negative  Gastrointestinal: Negative  Endocrine: Negative  Genitourinary: Negative  Musculoskeletal: Negative  Skin: Negative  Allergic/Immunologic: Negative  Neurological: Negative  Hematological: Negative  Psychiatric/Behavioral: Negative  Past Medical History:     Past Medical History:   Diagnosis Date    HPV in female       Past Surgical History:     Past Surgical History:   Procedure Laterality Date    BREAST CYST EXCISION Left     1980 benign ex bx followed failed needle biopsy    COLONOSCOPY      Fiberoptic    CRANIOTOMY      For Suboccipital Cranial Nerve Decompression, per [employer]scripts      Social History:        Social History     Socioeconomic History    Marital status: /Civil Union     Spouse name: None    Number of children: None    Years of education: None    Highest education level: None   Occupational History    None   Social Needs    Financial resource strain: None    Food insecurity     Worry: None     Inability: None    Transportation needs     Medical: None     Non-medical: None   Tobacco Use    Smoking status: Never Smoker    Smokeless tobacco: Never Used   Substance and Sexual Activity    Alcohol use:  Yes Alcohol/week: 1 0 standard drinks     Types: 1 Glasses of wine per week     Comment: occasional/ not weekly    Drug use: No    Sexual activity: Yes     Partners: Male   Lifestyle    Physical activity     Days per week: None     Minutes per session: None    Stress: None   Relationships    Social connections     Talks on phone: None     Gets together: None     Attends [employer] service: None     Active member of club or organization: None     Attends meetings of clubs or organizations: None     Relationship status: None    Intimate partner violence     Fear of current or ex partner: None     Emotionally abused: None     Physically abused: None     Forced sexual activity: None   Other Topics Concern    None   Social History Narrative    Always uses seat belt    Drinks coffee, drinks 1 cup a day    Has smoke detectors      Family History:     Family History   Problem Relation Age of Onset    Alzheimer's disease Mother     Hyperlipidemia Mother     Depression Mother     Dementia Father     Macular degeneration Father     Lymphoma Daughter 32    Pancreatic cancer Cousin     Pancreatic cancer Cousin     No Known Problems Sister     Breast cancer Maternal Grandmother 39    No Known Problems Maternal Grandfather     No Known Problems Paternal Grandmother     No Known Problems Paternal Grandfather     No Known Problems Sister     No Known Problems Sister     No Known Problems Maternal Aunt     No Known Problems Maternal Aunt     No Known Problems Paternal Aunt     No Known Problems Paternal Aunt     No Known Problems Paternal Aunt     No Known Problems Paternal Aunt     No Known Problems Paternal Aunt     Mental illness Neg Hx     Substance Abuse Neg Hx     Alcohol abuse Neg Hx       Current Medications:     Current Outpatient Medications   Medication Sig Dispense Refill    Calcium Carbonate-Vit D-Min (CALCIUM 1200 PO) Take 1 tablet by mouth daily      Cholecalciferol (VITAMIN D3) 1000 units CAPS Take 1 tablet by mouth daily      fluticasone (FLONASE) 50 mcg/act nasal spray USE 2 SPRAYS IN EACH NOSTRIL ONCE DAILY AS NEEDED 16 mL 3    Multiple Vitamins-Minerals (MULTIVITAMIN WITH MINERALS) tablet Take 1 tablet by mouth daily      predniSONE 10 mg tablet Take 5 tabs today and tomorrow with breakfast, 4 tabs on day 3,4 with breakfast, 3 tabs on day 5,6, 2 tabs on day 7,8 and 1 tab on day 9,10 30 tablet 0    predniSONE 20 mg tablet Take 1 tablet (20 mg total) by mouth 2 (two) times a day with meals 10 tablet 0    VITAMIN B COMPLEX-C PO Take 1 tablet by mouth daily       No current facility-administered medications for this visit  Allergies: Allergies   Allergen Reactions    Other Headache     Annotation - [id number]: raw onions  Seasonal       Physical Exam:     /76 (BP Location: Left arm, Patient Position: Sitting, Cuff Size: Standard)   Pulse 88   Temp 97 9 °F (36 6 °C) (Oral)   Resp 15   Ht 5' 1" (1 549 m)   Wt 60 6 kg (133 lb 8 oz)   BMI 25 22 kg/m²     Physical Exam  Constitutional:       Appearance: Normal appearance  She is well-developed and normal weight  HENT:      Head: Normocephalic and atraumatic  Right Ear: Hearing, tympanic membrane, ear canal and external ear normal       Left Ear: Hearing, tympanic membrane, ear canal and external ear normal       Nose: Nose normal       Mouth/Throat:      Mouth: Mucous membranes are moist       Pharynx: Oropharynx is clear  Uvula midline  Eyes:      Extraocular Movements: Extraocular movements intact  Conjunctiva/sclera: Conjunctivae normal       Pupils: Pupils are equal, round, and reactive to light  Neck:      Musculoskeletal: Normal range of motion and neck supple  Thyroid: No thyromegaly  Cardiovascular:      Rate and Rhythm: Normal rate and regular rhythm  Pulses: Normal pulses  Heart sounds: Normal heart sounds  No murmur     Pulmonary:      Effort: Pulmonary effort is normal       Breath sounds: Normal breath sounds  Abdominal:      General: Abdomen is flat  Bowel sounds are normal  There is no distension  Palpations: Abdomen is soft  There is no mass  Tenderness: There is no abdominal tenderness  Musculoskeletal: Normal range of motion  Lymphadenopathy:      Cervical: No cervical adenopathy  Skin:     General: Skin is warm  Neurological:      General: No focal deficit present  Mental Status: She is alert and oriented to person, place, and time  Cranial Nerves: No cranial nerve deficit  Deep Tendon Reflexes: Reflexes normal    Psychiatric:         Mood and Affect: Mood normal          Behavior: Behavior normal          Thought Content:  Thought content normal          Judgment: Judgment normal           Carolyn BlockJAMILAH

## 2021-01-25 NOTE — PATIENT INSTRUCTIONS

## 2021-02-03 ENCOUNTER — TELEPHONE (OUTPATIENT)
Dept: SURGICAL ONCOLOGY | Facility: CLINIC | Age: 66
End: 2021-02-03

## 2021-02-05 LAB
ALBUMIN SERPL-MCNC: 4.4 G/DL (ref 3.6–5.1)
ALBUMIN/GLOB SERPL: 2 (CALC) (ref 1–2.5)
ALP SERPL-CCNC: 59 U/L (ref 37–153)
ALT SERPL-CCNC: 15 U/L (ref 6–29)
APPEARANCE UR: CLEAR
AST SERPL-CCNC: 22 U/L (ref 10–35)
BASOPHILS # BLD AUTO: 50 CELLS/UL (ref 0–200)
BASOPHILS NFR BLD AUTO: 0.9 %
BILIRUB SERPL-MCNC: 0.6 MG/DL (ref 0.2–1.2)
BILIRUB UR QL STRIP: NEGATIVE
BUN SERPL-MCNC: 17 MG/DL (ref 7–25)
BUN/CREAT SERPL: NORMAL (CALC) (ref 6–22)
CALCIUM SERPL-MCNC: 9.6 MG/DL (ref 8.6–10.4)
CHLORIDE SERPL-SCNC: 103 MMOL/L (ref 98–110)
CHOLEST SERPL-MCNC: 241 MG/DL
CHOLEST/HDLC SERPL: 3.5 (CALC)
CO2 SERPL-SCNC: 28 MMOL/L (ref 20–32)
COLOR UR: YELLOW
CREAT SERPL-MCNC: 0.86 MG/DL (ref 0.5–0.99)
EOSINOPHIL # BLD AUTO: 99 CELLS/UL (ref 15–500)
EOSINOPHIL NFR BLD AUTO: 1.8 %
ERYTHROCYTE [DISTWIDTH] IN BLOOD BY AUTOMATED COUNT: 13.2 % (ref 11–15)
GLOBULIN SER CALC-MCNC: 2.2 G/DL (CALC) (ref 1.9–3.7)
GLUCOSE SERPL-MCNC: 99 MG/DL (ref 65–99)
GLUCOSE UR QL STRIP: NEGATIVE
HCT VFR BLD AUTO: 42.1 % (ref 35–45)
HDLC SERPL-MCNC: 68 MG/DL
HGB BLD-MCNC: 14.5 G/DL (ref 11.7–15.5)
HGB UR QL STRIP: NEGATIVE
KETONES UR QL STRIP: ABNORMAL
LDLC SERPL CALC-MCNC: 155 MG/DL (CALC)
LEUKOCYTE ESTERASE UR QL STRIP: NEGATIVE
LYMPHOCYTES # BLD AUTO: 2695 CELLS/UL (ref 850–3900)
LYMPHOCYTES NFR BLD AUTO: 49 %
MCH RBC QN AUTO: 31.4 PG (ref 27–33)
MCHC RBC AUTO-ENTMCNC: 34.4 G/DL (ref 32–36)
MCV RBC AUTO: 91.1 FL (ref 80–100)
MONOCYTES # BLD AUTO: 330 CELLS/UL (ref 200–950)
MONOCYTES NFR BLD AUTO: 6 %
NEUTROPHILS # BLD AUTO: 2327 CELLS/UL (ref 1500–7800)
NEUTROPHILS NFR BLD AUTO: 42.3 %
NITRITE UR QL STRIP: NEGATIVE
NONHDLC SERPL-MCNC: 173 MG/DL (CALC)
PH UR STRIP: 6.5 [PH] (ref 5–8)
PLATELET # BLD AUTO: 370 THOUSAND/UL (ref 140–400)
PMV BLD REES-ECKER: 10.7 FL (ref 7.5–12.5)
POTASSIUM SERPL-SCNC: 4.4 MMOL/L (ref 3.5–5.3)
PROT SERPL-MCNC: 6.6 G/DL (ref 6.1–8.1)
PROT UR QL STRIP: NEGATIVE
RBC # BLD AUTO: 4.62 MILLION/UL (ref 3.8–5.1)
SL AMB EGFR AFRICAN AMERICAN: 82 ML/MIN/1.73M2
SL AMB EGFR NON AFRICAN AMERICAN: 71 ML/MIN/1.73M2
SODIUM SERPL-SCNC: 141 MMOL/L (ref 135–146)
SP GR UR STRIP: 1.02 (ref 1–1.03)
TRIGL SERPL-MCNC: 75 MG/DL
TSH SERPL-ACNC: 1.26 MIU/L (ref 0.4–4.5)
WBC # BLD AUTO: 5.5 THOUSAND/UL (ref 3.8–10.8)

## 2021-02-09 ENCOUNTER — TELEPHONE (OUTPATIENT)
Dept: FAMILY MEDICINE CLINIC | Facility: CLINIC | Age: 66
End: 2021-02-09

## 2021-02-09 NOTE — TELEPHONE ENCOUNTER
----- Message from Yobani Leary PA-C sent at 2/9/2021 12:11 PM EST -----  Please let patient know her cholesterol was elevated compared to two years ago  Two years ago total was 233 now 241, goal less then 200  Work on diet, can repeat in 6 months   Sugar kidney liver thyroid all normal

## 2021-02-15 ENCOUNTER — HOSPITAL ENCOUNTER (OUTPATIENT)
Dept: MAMMOGRAPHY | Facility: CLINIC | Age: 66
Discharge: HOME | End: 2021-02-15
Payer: COMMERCIAL

## 2021-02-15 ENCOUNTER — HOSPITAL ENCOUNTER (OUTPATIENT)
Dept: BONE DENSITY | Facility: CLINIC | Age: 66
Discharge: HOME | End: 2021-02-15
Payer: COMMERCIAL

## 2021-02-15 VITALS — HEIGHT: 61 IN | BODY MASS INDEX: 25.11 KG/M2 | WEIGHT: 133 LBS

## 2021-02-15 DIAGNOSIS — Z12.31 ENCOUNTER FOR SCREENING MAMMOGRAM FOR MALIGNANT NEOPLASM OF BREAST: ICD-10-CM

## 2021-02-15 DIAGNOSIS — M85.80 OSTEOPENIA, UNSPECIFIED LOCATION: ICD-10-CM

## 2021-02-15 PROCEDURE — 77067 SCR MAMMO BI INCL CAD: CPT

## 2021-02-15 PROCEDURE — 77080 DXA BONE DENSITY AXIAL: CPT

## 2021-02-15 PROCEDURE — 77063 BREAST TOMOSYNTHESIS BI: CPT

## 2021-02-16 ENCOUNTER — TELEPHONE (OUTPATIENT)
Dept: FAMILY MEDICINE CLINIC | Facility: CLINIC | Age: 66
End: 2021-02-16

## 2021-02-16 NOTE — TELEPHONE ENCOUNTER
----- Message from Cheryl Owens PA-C sent at 2/16/2021  9:29 AM EST -----  Please let patient know her bone scan shows osteopenia, weakening of the bones  I want her to make sure she is taking 1200 mg calcium daily (600 mg twice daily) with vitamin d 2,000-3,000 IU daily  Also weight bearing exercise such as walking and light weights  We will repeat this in 18 months

## 2021-02-16 NOTE — TELEPHONE ENCOUNTER
Patient was informed regarding her dexa results, incorporating calcium, vitamin D3 and weight bearing exercises   MRP

## 2021-03-10 DIAGNOSIS — Z23 ENCOUNTER FOR IMMUNIZATION: ICD-10-CM

## 2021-03-29 ENCOUNTER — CLINICAL SUPPORT (OUTPATIENT)
Dept: FAMILY MEDICINE CLINIC | Facility: CLINIC | Age: 66
End: 2021-03-29
Payer: COMMERCIAL

## 2021-03-29 DIAGNOSIS — Z23 NEED FOR VACCINATION: Primary | ICD-10-CM

## 2021-03-29 PROCEDURE — 90471 IMMUNIZATION ADMIN: CPT

## 2021-03-29 PROCEDURE — 90750 HZV VACC RECOMBINANT IM: CPT

## 2021-04-14 ENCOUNTER — IMMUNIZATIONS (OUTPATIENT)
Dept: FAMILY MEDICINE CLINIC | Facility: HOSPITAL | Age: 66
End: 2021-04-14

## 2021-04-14 DIAGNOSIS — Z23 ENCOUNTER FOR IMMUNIZATION: Primary | ICD-10-CM

## 2021-04-14 PROCEDURE — 91300 SARS-COV-2 / COVID-19 MRNA VACCINE (PFIZER-BIONTECH) 30 MCG: CPT

## 2021-04-14 PROCEDURE — 0001A SARS-COV-2 / COVID-19 MRNA VACCINE (PFIZER-BIONTECH) 30 MCG: CPT

## 2021-04-23 DIAGNOSIS — J30.9 ALLERGIC RHINITIS, UNSPECIFIED SEASONALITY, UNSPECIFIED TRIGGER: ICD-10-CM

## 2021-04-23 RX ORDER — FLUTICASONE PROPIONATE 50 MCG
SPRAY, SUSPENSION (ML) NASAL
Qty: 16 ML | Refills: 3 | Status: SHIPPED | OUTPATIENT
Start: 2021-04-23

## 2021-04-26 ENCOUNTER — TELEPHONE (OUTPATIENT)
Dept: HEMATOLOGY ONCOLOGY | Facility: CLINIC | Age: 66
End: 2021-04-26

## 2021-04-26 ENCOUNTER — TELEPHONE (OUTPATIENT)
Dept: FAMILY MEDICINE CLINIC | Facility: CLINIC | Age: 66
End: 2021-04-26

## 2021-04-26 NOTE — TELEPHONE ENCOUNTER
Pt was referred to genetics back in Jan   They did reach out to her and left a message, but she accidentally erased it  She is looking for a name/phone number so that she can call them back

## 2021-04-26 NOTE — TELEPHONE ENCOUNTER
Spoke w/ pt, explained that [facility] does not have genetics program for this dx  Notified her that we would recommend either [facility] or [facility] for this type of testing  Pt voiced understanding

## 2021-05-07 ENCOUNTER — IMMUNIZATIONS (OUTPATIENT)
Dept: FAMILY MEDICINE CLINIC | Facility: HOSPITAL | Age: 66
End: 2021-05-07

## 2021-05-07 DIAGNOSIS — Z23 ENCOUNTER FOR IMMUNIZATION: Primary | ICD-10-CM

## 2021-05-07 PROCEDURE — 91300 SARS-COV-2 / COVID-19 MRNA VACCINE (PFIZER-BIONTECH) 30 MCG: CPT

## 2021-05-07 PROCEDURE — 0002A SARS-COV-2 / COVID-19 MRNA VACCINE (PFIZER-BIONTECH) 30 MCG: CPT

## 2021-12-05 ENCOUNTER — NURSE TRIAGE (OUTPATIENT)
Dept: OTHER | Facility: OTHER | Age: 66
End: 2021-12-05

## 2021-12-05 DIAGNOSIS — Z20.828 SARS-ASSOCIATED CORONAVIRUS EXPOSURE: Primary | ICD-10-CM

## 2021-12-05 PROCEDURE — U0005 INFEC AGEN DETEC AMPLI PROBE: HCPCS | Performed by: PHYSICIAN ASSISTANT

## 2021-12-05 PROCEDURE — U0003 INFECTIOUS AGENT DETECTION BY NUCLEIC ACID (DNA OR RNA); SEVERE ACUTE RESPIRATORY SYNDROME CORONAVIRUS 2 (SARS-COV-2) (CORONAVIRUS DISEASE [COVID-19]), AMPLIFIED PROBE TECHNIQUE, MAKING USE OF HIGH THROUGHPUT TECHNOLOGIES AS DESCRIBED BY CMS-2020-01-R: HCPCS | Performed by: PHYSICIAN ASSISTANT

## 2021-12-06 ENCOUNTER — TELEPHONE (OUTPATIENT)
Dept: FAMILY MEDICINE CLINIC | Facility: CLINIC | Age: 66
End: 2021-12-06

## 2022-04-06 ENCOUNTER — HOSPITAL ENCOUNTER (OUTPATIENT)
Dept: MAMMOGRAPHY | Facility: CLINIC | Age: 67
Discharge: HOME | End: 2022-04-06
Payer: MEDICARE

## 2022-04-06 VITALS — WEIGHT: 130 LBS | HEIGHT: 61 IN | BODY MASS INDEX: 24.55 KG/M2

## 2022-04-06 DIAGNOSIS — Z12.31 ENCOUNTER FOR SCREENING MAMMOGRAM FOR MALIGNANT NEOPLASM OF BREAST: ICD-10-CM

## 2022-04-06 PROCEDURE — 77067 SCR MAMMO BI INCL CAD: CPT

## 2022-04-06 PROCEDURE — 77063 BREAST TOMOSYNTHESIS BI: CPT

## 2022-05-13 ENCOUNTER — OFFICE VISIT (OUTPATIENT)
Dept: FAMILY MEDICINE CLINIC | Facility: CLINIC | Age: 67
End: 2022-05-13
Payer: MEDICARE

## 2022-05-13 VITALS
HEART RATE: 80 BPM | HEIGHT: 61 IN | WEIGHT: 126 LBS | SYSTOLIC BLOOD PRESSURE: 108 MMHG | BODY MASS INDEX: 23.79 KG/M2 | RESPIRATION RATE: 14 BRPM | OXYGEN SATURATION: 98 % | DIASTOLIC BLOOD PRESSURE: 70 MMHG

## 2022-05-13 DIAGNOSIS — G51.32 CLONIC HEMIFACIAL SPASM OF MUSCLE OF LEFT SIDE OF FACE: Primary | ICD-10-CM

## 2022-05-13 PROBLEM — G51.39 HEMIFACIAL SPASM: Status: RESOLVED | Noted: 2017-10-26 | Resolved: 2022-05-13

## 2022-05-13 PROCEDURE — 99213 OFFICE O/P EST LOW 20 MIN: CPT | Performed by: PHYSICIAN ASSISTANT

## 2022-05-13 NOTE — PROGRESS NOTES
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, saw Dr Kemi Faith who did a TRANSCONDYLAR APPROACH/SKULL, RESECT/EXCISE LESION, SKULL, REPAIR OF SKULL DEFECT, [place] [address] [place] [address]   LEFT REDO FAR LATERAL TRANSCONDYLAR CRANIOTOMY SKULL BASE APPROACH FOR NEUROVASCULAR TRANSPOSITION OF OFFENDING VESSELL  on 4/22/2022  Procedure was successful   No longer with clonic hemifacial spasm of the left face, here for suture removal        The following portions of the patient's history were reviewed and updated as appropriate: allergies, current medications, past family history, past medical history, past social history, past surgical history and problem list     Past Medical History:   Diagnosis Date    HPV in female      Past Surgical History:   Procedure Laterality Date    BREAST CYST EXCISION Left     1980 benign ex bx followed failed needle biopsy-entire lump removed    COLONOSCOPY      Fiberoptic    CRANIOTOMY      For Suboccipital Cranial Nerve Decompression, per [employer]     Family History   Problem Relation Age of Onset    Alzheimer's disease Mother     Hyperlipidemia Mother     Depression Mother     Dementia Father     Macular degeneration Father     Lymphoma Daughter 32    Pancreatic cancer Cousin 64    Pancreatic cancer Cousin 47    No Known Problems Sister     Breast cancer Maternal Grandmother 39    No Known Problems Maternal Grandfather     No Known Problems Paternal Grandmother     No Known Problems Paternal Grandfather     No Known Problems Sister     No Known Problems Sister     No Known Problems Maternal Aunt     No Known Problems Maternal Aunt     No Known Problems Paternal Aunt     No Known Problems Paternal Aunt     No Known Problems Paternal Aunt     No Known Problems Paternal Aunt     No Known Problems Paternal Aunt     No Known Problems Son     Mental illness Neg Hx     Substance Abuse Neg Hx     Alcohol abuse Neg Hx      Social History     Socioeconomic History    Marital status: /Civil Union     Spouse name: Not on file    Number of children: Not on file    Years of education: Not on file    Highest education level: Not on file   Occupational History    Not on file   Tobacco Use    Smoking status: Never Smoker    Smokeless tobacco: Never Used   Vaping Use    Vaping Use: Never used   Substance and Sexual Activity    Alcohol use:  Yes     Alcohol/week: 1 0 standard drink     Types: 1 Glasses of wine per week     Comment: occasional/ not weekly    Drug use: No    Sexual activity: Yes     Partners: Male   Other Topics Concern    Not on file   Social History Narrative    Always uses seat belt    Drinks coffee, drinks 1 cup a day    Has smoke detectors     Social Determinants of Health     Financial Resource Strain: Not on file   Food Insecurity: Not on file   Transportation Needs: Not on file   Physical Activity: Not on file   Stress: Not on file   Social Connections: Not on file   Intimate Partner Violence: Not on file   Housing Stability: Not on file       Current Outpatient Medications:     Calcium Carbonate-Vit D-Min (CALCIUM 1200 PO), Take 1 tablet by mouth daily, Disp: , Rfl:     Cholecalciferol (VITAMIN D3) 1000 units CAPS, Take 1 tablet by mouth daily, Disp: , Rfl:     fluticasone (FLONASE) 50 mcg/act nasal spray, USE 2 SPRAYS IN EACH NOSTRIL ONCE DAILY AS NEEDED, Disp: 16 mL, Rfl: 3    Multiple Vitamins-Minerals (MULTIVITAMIN WITH MINERALS) tablet, Take 1 tablet by mouth daily, Disp: , Rfl:     VITAMIN B COMPLEX-C PO, Take 1 tablet by mouth daily, Disp: , Rfl:     Review of Systems          Objective:    Vitals:    05/13/22 0854   BP: 108/70   Pulse: 80   Resp: 14   SpO2: 98%   Weight: 57 2 kg (126 lb)   Height: 5' 1" (1 549 m)        Physical Exam  Constitutional:       Appearance: Normal appearance  HENT:      Head: Normocephalic and atraumatic  Skin:     General: Skin is warm  Comments: Post auricular left linear surgical scar with one running stitch, well healed  Area cleaned and suture removed without complication   Neurological:      General: No focal deficit present  Mental Status: She is alert and oriented to person, place, and time  Psychiatric:         Mood and Affect: Mood normal          Behavior: Behavior normal          Thought Content:  Thought content normal          Judgment: Judgment normal

## 2022-06-03 ENCOUNTER — OFFICE VISIT (OUTPATIENT)
Dept: FAMILY MEDICINE CLINIC | Facility: CLINIC | Age: 67
End: 2022-06-03
Payer: MEDICARE

## 2022-06-03 VITALS
HEART RATE: 84 BPM | RESPIRATION RATE: 16 BRPM | WEIGHT: 123.6 LBS | BODY MASS INDEX: 23.33 KG/M2 | SYSTOLIC BLOOD PRESSURE: 102 MMHG | DIASTOLIC BLOOD PRESSURE: 70 MMHG | HEIGHT: 61 IN

## 2022-06-03 DIAGNOSIS — Z23 NEED FOR PNEUMOCOCCAL VACCINATION: ICD-10-CM

## 2022-06-03 DIAGNOSIS — E78.2 MIXED HYPERLIPIDEMIA: ICD-10-CM

## 2022-06-03 DIAGNOSIS — Z00.00 MEDICARE ANNUAL WELLNESS VISIT, INITIAL: Primary | ICD-10-CM

## 2022-06-03 DIAGNOSIS — J04.0 LARYNGITIS: ICD-10-CM

## 2022-06-03 PROCEDURE — G0438 PPPS, INITIAL VISIT: HCPCS | Performed by: PHYSICIAN ASSISTANT

## 2022-06-03 PROCEDURE — 90677 PCV20 VACCINE IM: CPT

## 2022-06-03 PROCEDURE — 1123F ACP DISCUSS/DSCN MKR DOCD: CPT | Performed by: PHYSICIAN ASSISTANT

## 2022-06-03 PROCEDURE — G0009 ADMIN PNEUMOCOCCAL VACCINE: HCPCS

## 2022-06-03 NOTE — PROGRESS NOTES
Assessment and Plan:     AWV     Prevnar 20 today  Labs ordered  Discussed advanced directives       Preventive health issues were discussed with patient, and age appropriate screening tests were ordered as noted in patient's After Visit Summary  Personalized health advice and appropriate referrals for health education or preventive services given if needed, as noted in patient's After Visit Summary  History of Present Illness:     [facility]  Patient Care Team:  Eliseo Brice PA-C as PCP - General (Family Medicine)  Eliseo Brice PA-C     Review of Systems:     Review of Systems   Constitutional: Negative for chills and fever  HENT: Negative for ear pain and sore throat  Eyes: Negative for pain and visual disturbance  Respiratory: Negative for cough and shortness of breath  Cardiovascular: Negative for chest pain and palpitations  Gastrointestinal: Negative for abdominal pain and vomiting  Genitourinary: Negative for dysuria and hematuria  Musculoskeletal: Negative for arthralgias and back pain  Skin: Negative for color change and rash  Neurological: Negative for seizures and syncope  All other systems reviewed and are negative       Problem List:     Patient Active Problem List   Diagnosis    Allergic rhinitis    Hyperlipidemia    Osteopenia    Vitamin D deficiency    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      Past Medical and Surgical History:     Past Medical History:   Diagnosis Date    HPV in female      Past Surgical History:   Procedure Laterality Date    BREAST CYST EXCISION Left     1980 benign ex bx followed failed needle biopsy-entire lump removed    COLONOSCOPY      Fiberoptic    CRANIOTOMY      For Suboccipital Cranial Nerve Decompression, per [employer]scripts      Family History:     Family History   Problem Relation Age of Onset    Alzheimer's disease Mother     Hyperlipidemia Mother     Depression Mother    Luiza White Dementia Father     Macular degeneration Father     Lymphoma Daughter 32    Pancreatic cancer Cousin 64    Pancreatic cancer Cousin 47    No Known Problems Sister     Breast cancer Maternal Grandmother 39    No Known Problems Maternal Grandfather     No Known Problems Paternal Grandmother     No Known Problems Paternal Grandfather     No Known Problems Sister     No Known Problems Sister     No Known Problems Maternal Aunt     No Known Problems Maternal Aunt     No Known Problems Paternal Aunt     No Known Problems Paternal Aunt     No Known Problems Paternal Aunt     No Known Problems Paternal Aunt     No Known Problems Paternal Aunt     No Known Problems Son     Mental illness Neg Hx     Substance Abuse Neg Hx     Alcohol abuse Neg Hx       Social History:     Social History     Socioeconomic History    Marital status: /Civil Union     Spouse name: None    Number of children: None    Years of education: None    Highest education level: None   Occupational History    None   Tobacco Use    Smoking status: Never Smoker    Smokeless tobacco: Never Used   Vaping Use    Vaping Use: Never used   Substance and Sexual Activity    Alcohol use:  Yes     Alcohol/week: 1 0 standard drink     Types: 1 Glasses of wine per week     Comment: occasional/ not weekly    Drug use: No    Sexual activity: Yes     Partners: Male   Other Topics Concern    None   Social History Narrative    Always uses seat belt    Drinks coffee, drinks 1 cup a day    Has smoke detectors     Social Determinants of Health     Financial Resource Strain: Not on file   Food Insecurity: Not on file   Transportation Needs: Not on file   Physical Activity: Not on file   Stress: Not on file   Social Connections: Not on file   Intimate Partner Violence: Not on file   Housing Stability: Not on file      Medications and Allergies:     Current Outpatient Medications   Medication Sig Dispense Refill    Calcium Carbonate-Vit D-Min (CALCIUM 1200 PO) Take 1 tablet by mouth daily      Cholecalciferol (VITAMIN D3) 1000 units CAPS Take 1 tablet by mouth daily      fluticasone (FLONASE) 50 mcg/act nasal spray USE 2 SPRAYS IN EACH NOSTRIL ONCE DAILY AS NEEDED 16 mL 3    Multiple Vitamins-Minerals (MULTIVITAMIN WITH MINERALS) tablet Take 1 tablet by mouth daily      VITAMIN B COMPLEX-C PO Take 1 tablet by mouth daily       No current facility-administered medications for this visit  No Known Allergies   Immunizations:     Immunization History   Administered Date(s) Administered    COVID-19 PFIZER VACCINE 0 3 ML IM 04/14/2021, 05/07/2021    INFLUENZA 04/25/2017    Influenza Quadrivalent Preservative Free 3 years and older IM 04/25/2017    Pneumococcal Conjugate 13-Valent 01/25/2021    Tdap 11/01/2007, 01/09/2018    Zoster Vaccine Recombinant 01/25/2021, 03/29/2021      Health Maintenance:         Topic Date Due    Cervical Cancer Screening  02/01/2020    Hepatitis C Screening  06/13/2024 (Originally 1955)    Breast Cancer Screening: Mammogram  04/06/2023    DXA SCAN  02/15/2024    Colorectal Cancer Screening  03/28/2024         Topic Date Due    COVID-19 Vaccine (3 - Booster for Pfizer series) 10/07/2021    Pneumococcal Vaccine: 65+ Years (2 - PPSV23 or PCV20) 01/25/2022    Influenza Vaccine (Season Ended) 09/01/2022      Medicare Screening Tests and Risk Assessments:     Ann Hi is here for her Initial Wellness visit  Health Risk Assessment:   Patient rates overall health as good  Patient feels that their physical health rating is same  Patient is very satisfied with their life  Eyesight was rated as same  Hearing was rated as same  Patient feels that their emotional and mental health rating is much better  Patients states they are never, rarely angry  Patient states they are often unusually tired/fatigued  Pain experienced in the last 7 days has been some  Patient's pain rating has been 6/10   Patient states that she has experienced no weight loss or gain in last 6 months  Depression Screening:   PHQ-2 Score: 0      Fall Risk Screening: In the past year, patient has experienced: no history of falling in past year      Urinary Incontinence Screening:   Patient has not leaked urine accidently in the last six months  Home Safety:  Patient does not have trouble with stairs inside or outside of their home  Patient has working smoke alarms and has working carbon monoxide detector  Home safety hazards include: none  Nutrition:   Current diet is Other (please comment)  Swallowing and vocal cord issues due to intubation for surgery 04 22 22  Medications:   Patient is currently taking over-the-counter supplements  OTC medications include: Vitamins  Patient is able to manage medications  Activities of Daily Living (ADLs)/Instrumental Activities of Daily Living (IADLs):   Walk and transfer into and out of bed and chair?: Yes  Dress and groom yourself?: Yes    Bathe or shower yourself?: Yes    Feed yourself?  Yes  Do your laundry/housekeeping?: Yes  Manage your money, pay your bills and track your expenses?: Yes  Make your own meals?: Yes    Do your own shopping?: Yes    Previous Hospitalizations:   Any hospitalizations or ED visits within the last 12 months?: No      Hospitalization Comments: [facility] surgery 04 22 22    Advance Care Planning:   Living will: No    Durable POA for healthcare: No    Advanced directive: No    Advanced directive counseling given: Yes      Cognitive Screening:   Provider or family/friend/caregiver concerned regarding cognition?: No    PREVENTIVE SCREENINGS      Cardiovascular Screening:    General: Screening Not Indicated, History Lipid Disorder and Risks and Benefits Discussed    Due for: Lipid Panel      Diabetes Screening:     General: Risks and Benefits Discussed    Due for: Blood Glucose      Colorectal Cancer Screening:     General: Screening Current      Breast Cancer Screening: General: Screening Current      Cervical Cancer Screening:    General: Screening Not Indicated      Osteoporosis Screening:    General: Screening Current      Abdominal Aortic Aneurysm (AAA) Screening:        General: Screening Not Indicated      Lung Cancer Screening:     General: Screening Not Indicated      Hepatitis C Screening:    General: Screening Current    Screening, Brief Intervention, and Referral to Treatment (SBIRT)    Screening  Typical number of drinks in a day: 0  Typical number of drinks in a week: 0  Interpretation: Low risk drinking behavior  AUDIT-C Screenin) How often did you have a drink containing alcohol in the past year? 2 to 4 times a month  2) How many drinks did you have on a typical day when you were drinking in the past year? 1 to 2  3) How often did you have 6 or more drinks on one occasion in the past year? never    AUDIT-C Score: 2  Interpretation: Score 0-2 (female): Negative screen for alcohol misuse    Single Item Drug Screening:  How often have you used an illegal drug (including marijuana) or a prescription medication for non-medical reasons in the past year? never    Single Item Drug Screen Score: 0  Interpretation: Negative screen for possible drug use disorder    Brief Intervention  Alcohol & drug use screenings were reviewed  No concerns regarding substance use disorder identified  Other Counseling Topics:   Car/seat belt/driving safety, skin self-exam, sunscreen and calcium and vitamin D intake and regular weightbearing exercise  No exam data present     Physical Exam:     /70   Pulse 84   Resp 16   Ht 5' 1" (1 549 m)   Wt 56 1 kg (123 lb 9 6 oz)   BMI 23 35 kg/m²     Physical Exam  Constitutional:       Appearance: Normal appearance  She is well-developed and normal weight  HENT:      Head: Normocephalic and atraumatic  Right Ear: Hearing normal       Left Ear: Hearing normal       Mouth/Throat:      Pharynx: Uvula midline     Eyes: Extraocular Movements: Extraocular movements intact  Conjunctiva/sclera: Conjunctivae normal       Pupils: Pupils are equal, round, and reactive to light  Neck:      Thyroid: No thyromegaly  Cardiovascular:      Rate and Rhythm: Normal rate and regular rhythm  Pulses: Normal pulses  Heart sounds: Normal heart sounds  No murmur heard  Pulmonary:      Effort: Pulmonary effort is normal       Breath sounds: Normal breath sounds  Abdominal:      General: Abdomen is flat  Bowel sounds are normal  There is no distension  Palpations: Abdomen is soft  There is no mass  Tenderness: There is no abdominal tenderness  Musculoskeletal:         General: Normal range of motion  Cervical back: Normal range of motion and neck supple  Lymphadenopathy:      Cervical: No cervical adenopathy  Skin:     General: Skin is warm  Neurological:      General: No focal deficit present  Mental Status: She is alert and oriented to person, place, and time  Cranial Nerves: No cranial nerve deficit  Deep Tendon Reflexes: Reflexes normal    Psychiatric:         Mood and Affect: Mood normal          Behavior: Behavior normal          Thought Content:  Thought content normal          Judgment: Judgment normal           Tg Du PA-C

## 2022-06-03 NOTE — PATIENT INSTRUCTIONS
Medicare Preventive Visit Patient Instructions  Thank you for completing your Welcome to Medicare Visit or Medicare Annual Wellness Visit today  Your next wellness visit will be due in one year (6/4/2023)  The screening/preventive services that you may require over the next 5-10 years are detailed below  Some tests may not apply to you based off risk factors and/or age  Screening tests ordered at today's visit but not completed yet may show as past due  Also, please note that scanned in results may not display below  Preventive Screenings:  Service Recommendations Previous Testing/Comments   Colorectal Cancer Screening  * Colonoscopy    * Fecal Occult Blood Test (FOBT)/Fecal Immunochemical Test (FIT)  * Fecal DNA/Cologuard Test  * Flexible Sigmoidoscopy Age: 54-65 years old   Colonoscopy: every 10 years (may be performed more frequently if at higher risk)  OR  FOBT/FIT: every 1 year  OR  Cologuard: every 3 years  OR  Sigmoidoscopy: every 5 years  Screening may be recommended earlier than age 48 if at higher risk for colorectal cancer  Also, an individualized decision between you and your healthcare provider will decide whether screening between the ages of 74-80 would be appropriate  Colonoscopy: 03/28/2014  FOBT/FIT: Not on file  Cologuard: Not on file  Sigmoidoscopy: Not on file    Screening Current     Breast Cancer Screening Age: 36 years old  Frequency: every 1-2 years  Not required if history of left and right mastectomy Mammogram: 04/06/2022    Screening Current   Cervical Cancer Screening Between the ages of 21-29, pap smear recommended once every 3 years  Between the ages of 33-67, can perform pap smear with HPV co-testing every 5 years     Recommendations may differ for women with a history of total hysterectomy, cervical cancer, or abnormal pap smears in past  Pap Smear: 02/01/2017    Screening Not Indicated   Hepatitis C Screening Once for adults born between 1945 and 1965  More frequently in patients at high risk for Hepatitis C Hep C Antibody: Not on file    Screening Current   Diabetes Screening 1-2 times per year if you're at risk for diabetes or have pre-diabetes Fasting glucose: 84 mg/dL   A1C: No results in last 5 years        Cholesterol Screening Once every 5 years if you don't have a lipid disorder  May order more often based on risk factors  Lipid panel: 02/04/2021    Screening Not Indicated  History Lipid Disorder     Other Preventive Screenings Covered by Medicare:  1  Abdominal Aortic Aneurysm (AAA) Screening: covered once if your at risk  You're considered to be at risk if you have a family history of AAA  2  Lung Cancer Screening: covers low dose CT scan once per year if you meet all of the following conditions: (1) Age 50-69; (2) No signs or symptoms of lung cancer; (3) Current smoker or have quit smoking within the last 15 years; (4) You have a tobacco smoking history of at least 30 pack years (packs per day multiplied by number of years you smoked); (5) You get a written order from a healthcare provider  3  Glaucoma Screening: covered annually if you're considered high risk: (1) You have diabetes OR (2) Family history of glaucoma OR (3)  aged 48 and older OR (3)  American aged 72 and older  3  Osteoporosis Screening: covered every 2 years if you meet one of the following conditions: (1) You're estrogen deficient and at risk for osteoporosis based off medical history and other findings; (2) Have a vertebral abnormality; (3) On glucocorticoid therapy for more than 3 months; (4) Have primary hyperparathyroidism; (5) On osteoporosis medications and need to assess response to drug therapy  · Last bone density test (DXA Scan): 02/15/2021   5  HIV Screening: covered annually if you're between the age of 15-65  Also covered annually if you are younger than 13 and older than 72 with risk factors for HIV infection   For pregnant patients, it is covered up to 3 times per pregnancy  Immunizations:  Immunization Recommendations   Influenza Vaccine Annual influenza vaccination during flu season is recommended for all persons aged >= 6 months who do not have contraindications   Pneumococcal Vaccine (Prevnar and Pneumovax)  * Prevnar = PCV13  * Pneumovax = PPSV23   Adults 25-60 years old: 1-3 doses may be recommended based on certain risk factors  Adults 72 years old: Prevnar (PCV13) vaccine recommended followed by Pneumovax (PPSV23) vaccine  If already received PPSV23 since turning 65, then PCV13 recommended at least one year after PPSV23 dose  Hepatitis B Vaccine 3 dose series if at intermediate or high risk (ex: diabetes, end stage renal disease, liver disease)   Tetanus (Td) Vaccine - COST NOT COVERED BY MEDICARE PART B Following completion of primary series, a booster dose should be given every 10 years to maintain immunity against tetanus  Td may also be given as tetanus wound prophylaxis  Tdap Vaccine - COST NOT COVERED BY MEDICARE PART B Recommended at least once for all adults  For pregnant patients, recommended with each pregnancy  Shingles Vaccine (Shingrix) - COST NOT COVERED BY MEDICARE PART B  2 shot series recommended in those aged 48 and above     Health Maintenance Due:      Topic Date Due    Cervical Cancer Screening  02/01/2020    Hepatitis C Screening  06/13/2024 (Originally 1955)    Breast Cancer Screening: Mammogram  04/06/2023    DXA SCAN  02/15/2024    Colorectal Cancer Screening  03/28/2024     Immunizations Due:      Topic Date Due    COVID-19 Vaccine (3 - Booster for Pfizer series) 10/07/2021    Pneumococcal Vaccine: 65+ Years (2 - PPSV23 or PCV20) 01/25/2022    Influenza Vaccine (Season Ended) 09/01/2022     Advance Directives   What are advance directives? Advance directives are legal documents that state your wishes and plans for medical care   These plans are made ahead of time in case you lose your ability to make decisions for yourself  Advance directives can apply to any medical decision, such as the treatments you want, and if you want to donate organs  What are the types of advance directives? There are many types of advance directives, and each state has rules about how to use them  You may choose a combination of any of the following:  · Living will: This is a written record of the treatment you want  You can also choose which treatments you do not want, which to limit, and which to stop at a certain time  This includes surgery, medicine, IV fluid, and tube feedings  · Durable power of  for healthcare [place] [facility]): This is a written record that states who you want to make healthcare choices for you when you are unable to make them for yourself  This person, called a proxy, is usually a family member or a friend  You may choose more than 1 proxy  · Do not resuscitate (DNR) order:  A DNR order is used in case your heart stops beating or you stop breathing  It is a request not to have certain forms of treatment, such as CPR  A DNR order may be included in other types of advance directives  · Medical directive: This covers the care that you want if you are in a coma, near death, or unable to make decisions for yourself  You can list the treatments you want for each condition  Treatment may include pain medicine, surgery, blood transfusions, dialysis, IV or tube feedings, and a ventilator (breathing machine)  · Values history: This document has questions about your views, beliefs, and how you feel and think about life  This information can help others choose the care that you would choose  Why are advance directives important? An advance directive helps you control your care  Although spoken wishes may be used, it is better to have your wishes written down  Spoken wishes can be misunderstood, or not followed  Treatments may be given even if you do not want them   An advance directive may make it easier for your family to make difficult choices about your care  © Copyright [address] 2018 Information is for End User's use only and may not be sold, redistributed or otherwise used for commercial purposes   All illustrations and images included in CareNotes® are the copyrighted property of A D [employer] M , [employer]  or [address] [place] [address]

## 2022-06-14 ENCOUNTER — ANNUAL EXAM (OUTPATIENT)
Dept: OBGYN CLINIC | Facility: CLINIC | Age: 67
End: 2022-06-14
Payer: MEDICARE

## 2022-06-14 VITALS
BODY MASS INDEX: 23.22 KG/M2 | DIASTOLIC BLOOD PRESSURE: 60 MMHG | SYSTOLIC BLOOD PRESSURE: 90 MMHG | HEIGHT: 61 IN | WEIGHT: 123 LBS

## 2022-06-14 DIAGNOSIS — R87.810 PAPANICOLAOU SMEAR OF CERVIX WITH POSITIVE HIGH RISK HUMAN PAPILLOMA VIRUS (HPV) TEST: Primary | ICD-10-CM

## 2022-06-14 DIAGNOSIS — Z80.3 FAMILY HISTORY OF BREAST CANCER: ICD-10-CM

## 2022-06-14 DIAGNOSIS — Z12.31 ENCOUNTER FOR SCREENING MAMMOGRAM FOR MALIGNANT NEOPLASM OF BREAST: ICD-10-CM

## 2022-06-14 DIAGNOSIS — M85.80 OSTEOPENIA, UNSPECIFIED LOCATION: ICD-10-CM

## 2022-06-14 PROCEDURE — 99213 OFFICE O/P EST LOW 20 MIN: CPT | Performed by: OBSTETRICS & GYNECOLOGY

## 2022-06-14 RX ORDER — UBIDECARENONE 75 MG
CAPSULE ORAL DAILY
COMMUNITY

## 2022-06-14 NOTE — ASSESSMENT & PLAN NOTE
2/2021 osteopenia femoral neck only; slightly worsened from 2016, first noted in 2012    Per pt PCP recom Ca, Vit D, exercise

## 2022-06-14 NOTE — LETTER
2022     Courtney Schroeder PA-C  [address], [address]    Patient: Yesenia Markham   YOB: 1955   Date of Visit: 2022       Dear Dr Lore Durán: Thank you for referring Harpal Conroy to me for evaluation  Below are my notes for this consultation  If you have questions, please do not hesitate to call me  I look forward to following your patient along with you  Sincerely,        Berkley Carter MD        CC: No Recipients  Berkley Carter MD  2022  1:40 PM  Sign when Signing Visit  [address], [address] [address]    Assessment/Plan:  1  Papanicolaou smear of cervix with positive high risk human papilloma virus (HPV) test  Assessment & Plan:   and  paps negative cytology with + HR HPV    + HPV 18   3/2021 colpo normal and ECC benign  Repeat pap with HPV testing today  Orders:  -     Thinprep Tis Pap and HPV mRNA E6/E7 Reflex HPV 16,18/45    2  Osteopenia, unspecified location  Assessment & Plan:  2021 osteopenia femoral neck only; slightly worsened from 2016, first noted in 2012  Per pt PCP recom Ca, Vit D, exercise      3  Encounter for screening mammogram for malignant neoplasm of breast  -     Mammo screening bilateral w 3d & cad; Future; Expected date: 2023    4  Family history of breast cancer  -     Mammo screening bilateral w 3d & cad; Future; Expected date: 2023      Subjective:   Yesenia Markham is a 79 y o   female  CC: follow up abnormal pap smear      HPI:   Here for follow up pap smear  Colpo benign ECC last year  Denies pelvic issues or bleeding  Had surgery for fascial tick in April  Feels significantly improved  No breast or urinary complaints  Gyn History  No LMP recorded  Patient is postmenopausal        She  reports being sexually active and has had partner(s) who are male   She reports using the following method of birth control/protection: Post-menopausal        OB History  [id number]    Past Medical History:  No date: Borderline high cholesterol  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  No date: Facial spasm  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  No date: Osteopenia  02/2021: Papanicolaou smear     Past Surgical History:  04/22/2022: BRAIN SURGERY; N/A      Comment:  Drain fluid from dura and holly from prior surgery for                javid-facial spasms  No date: BREAST CYST EXCISION; Left      Comment:  1980 benign ex bx followed failed needle biopsy-entire                lump removed  No date: COLONOSCOPY      Comment:  Fiberoptic  03/2021: COLPOSCOPY  2017: CRANIOTOMY      Comment: For Suboccipital Cranial Nerve Decompression, per                Allscripts  01/2022: SKIN LESION EXCISION; N/A      Comment:  upper left thigh-benign     Social History     Tobacco Use    Smoking status: Never Smoker    Smokeless tobacco: Never Used   Vaping Use    Vaping Use: Never used   Substance Use Topics    Alcohol use: Yes     Comment: occasional/ not weekly    Drug use: Never          Current Outpatient Medications:     Calcium Carbonate-Vit D-Min (CALCIUM 1200 PO), Take 1 tablet by mouth daily, Disp: , Rfl:     Cholecalciferol (VITAMIN D3) 1000 units CAPS, Take 1 tablet by mouth daily, Disp: , Rfl:     cyanocobalamin (VITAMIN B-12) 100 mcg tablet, Take by mouth daily, Disp: , Rfl:     fluticasone (FLONASE) 50 mcg/act nasal spray, USE 2 SPRAYS IN EACH NOSTRIL ONCE DAILY AS NEEDED, Disp: 16 mL, Rfl: 3    Multiple Vitamins-Minerals (MULTIVITAMIN WITH MINERALS) tablet, Take 1 tablet by mouth daily, Disp: , Rfl:     VITAMIN B COMPLEX-C PO, Take 1 tablet by mouth daily, Disp: , Rfl:     She has No Known Allergies       ROS: Review of Systems   Constitutional: Negative  Gastrointestinal: Negative  Genitourinary: Negative  Psychiatric/Behavioral: Negative          Objective:  BP 90/60   Ht 5' 0 5" (1 537 m)   Wt 55 8 kg (123 lb)   Breastfeeding No BMI 23 63 kg/m²      Physical Exam  Constitutional:       Appearance: Normal appearance  Chest:   Breasts:      Right: Normal  No mass, tenderness or axillary adenopathy  Left: Normal  No mass, tenderness or axillary adenopathy  Abdominal:      Palpations: Abdomen is soft  Tenderness: There is no abdominal tenderness  Genitourinary:     General: Normal vulva  Vagina: No bleeding or lesions  Cervix: Normal       Uterus: Normal  Not tender  Adnexa:         Right: No mass or tenderness  Left: No mass or tenderness  Rectum: No mass or external hemorrhoid  Normal anal tone  Comments: Atrophic changes appropriate to age  Musculoskeletal:         General: Normal range of motion  Lymphadenopathy:      Upper Body:      Right upper body: No axillary adenopathy  Left upper body: No axillary adenopathy  Neurological:      Mental Status: She is alert and oriented to person, place, and time     Psychiatric:         Mood and Affect: Mood normal          Behavior: Behavior normal

## 2022-06-14 NOTE — ASSESSMENT & PLAN NOTE
2020 and 2021 paps negative cytology with + HR HPV   2021 + HPV 18   3/2021 colpo normal and ECC benign  Repeat pap with HPV testing today

## 2022-06-14 NOTE — PROGRESS NOTES
[address] [facility], [address], [address]    Assessment/Plan:  1  Papanicolaou smear of cervix with positive high risk human papilloma virus (HPV) test  Assessment & Plan:   and  paps negative cytology with + HR HPV    + HPV 18   3/2021 colpo normal and ECC benign  Repeat pap with HPV testing today  Orders:  -     Thinprep Tis Pap and HPV mRNA E6/E7 Reflex HPV 16,18/45    2  Osteopenia, unspecified location  Assessment & Plan:  2021 osteopenia femoral neck only; slightly worsened from 2016, first noted in   Per pt PCP recom Ca, Vit D, exercise      3  Encounter for screening mammogram for malignant neoplasm of breast  -     Mammo screening bilateral w 3d & cad; Future; Expected date: 2023    4  Family history of breast cancer  -     Mammo screening bilateral w 3d & cad; Future; Expected date: 2023      Subjective:   Fransisco Jacques is a 79 y o   female  CC: follow up abnormal pap smear      HPI:   Here for follow up pap smear  Colpo benign ECC last year  Denies pelvic issues or bleeding  Had surgery for fascial tick in April  Feels significantly improved  No breast or urinary complaints  Gyn History  No LMP recorded  Patient is postmenopausal        She  reports being sexually active and has had partner(s) who are male  She reports using the following method of birth control/protection: Post-menopausal        OB History  [id number]    Past Medical History:  No date: Borderline high cholesterol  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  No date: Facial spasm  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  No date: Osteopenia  2021: Papanicolaou smear     Past Surgical History:  2022: BRAIN SURGERY; N/A      Comment:  Drain fluid from dura and holly from prior surgery for                javid-facial spasms  No date: BREAST CYST EXCISION;  Left      Comment:   benign ex bx followed failed needle biopsy-entire                lump removed  No date: COLONOSCOPY      Comment:  Fiberoptic  03/2021: COLPOSCOPY  2017: CRANIOTOMY      Comment: For Suboccipital Cranial Nerve Decompression, per                [employer]scripts  01/2022: SKIN LESION EXCISION; N/A      Comment:  upper left thigh-benign     Social History     Tobacco Use    Smoking status: Never Smoker    Smokeless tobacco: Never Used   Vaping Use    Vaping Use: Never used   Substance Use Topics    Alcohol use: Yes     Comment: occasional/ not weekly    Drug use: Never          Current Outpatient Medications:     Calcium Carbonate-Vit D-Min (CALCIUM 1200 PO), Take 1 tablet by mouth daily, Disp: , Rfl:     Cholecalciferol (VITAMIN D3) 1000 units CAPS, Take 1 tablet by mouth daily, Disp: , Rfl:     cyanocobalamin (VITAMIN B-12) 100 mcg tablet, Take by mouth daily, Disp: , Rfl:     fluticasone (FLONASE) 50 mcg/act nasal spray, USE 2 SPRAYS IN EACH NOSTRIL ONCE DAILY AS NEEDED, Disp: 16 mL, Rfl: 3    Multiple Vitamins-Minerals (MULTIVITAMIN WITH MINERALS) tablet, Take 1 tablet by mouth daily, Disp: , Rfl:     VITAMIN B COMPLEX-C PO, Take 1 tablet by mouth daily, Disp: , Rfl:     She has No Known Allergies       ROS: Review of Systems   Constitutional: Negative  Gastrointestinal: Negative  Genitourinary: Negative  Psychiatric/Behavioral: Negative  Objective:  BP 90/60   Ht 5' 0 5" (1 537 m)   Wt 55 8 kg (123 lb)   Breastfeeding No   BMI 23 63 kg/m²      Physical Exam  Constitutional:       Appearance: Normal appearance  Chest:   Breasts:      Right: Normal  No mass, tenderness or axillary adenopathy  Left: Normal  No mass, tenderness or axillary adenopathy  Abdominal:      Palpations: Abdomen is soft  Tenderness: There is no abdominal tenderness  Genitourinary:     General: Normal vulva  Vagina: No bleeding or lesions  Cervix: Normal       Uterus: Normal  Not tender  Adnexa:         Right: No mass or tenderness  Left: No mass or tenderness  Rectum: No mass or external hemorrhoid  Normal anal tone  Comments: Atrophic changes appropriate to age  Musculoskeletal:         General: Normal range of motion  Lymphadenopathy:      Upper Body:      Right upper body: No axillary adenopathy  Left upper body: No axillary adenopathy  Neurological:      Mental Status: She is alert and oriented to person, place, and time     Psychiatric:         Mood and Affect: Mood normal          Behavior: Behavior normal

## 2022-06-16 LAB
CLINICAL INFO: NORMAL
CYTO CVX: NORMAL
CYTOLOGY CMNT CVX/VAG CYTO-IMP: NORMAL
DATE PREVIOUS BX: NORMAL
HPV E6+E7 MRNA CVX QL NAA+PROBE: NOT DETECTED
LMP START DATE: NORMAL
SL AMB PREV. PAP:: NORMAL
SPECIMEN SOURCE CVX/VAG CYTO: NORMAL

## 2022-06-17 ENCOUNTER — TELEPHONE (OUTPATIENT)
Dept: FAMILY MEDICINE CLINIC | Facility: CLINIC | Age: 67
End: 2022-06-17

## 2022-06-17 NOTE — TELEPHONE ENCOUNTER
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

## 2022-12-12 PROBLEM — J38.01 PARALYSIS OF LEFT VOCAL FOLD: Status: ACTIVE | Noted: 2022-12-12

## 2022-12-12 PROBLEM — J38.3 GLOTTIC INSUFFICIENCY: Status: ACTIVE | Noted: 2022-12-12

## 2022-12-12 PROBLEM — G52.7: Status: ACTIVE | Noted: 2022-12-12

## 2022-12-12 PROBLEM — R49.0 DYSPHONIA: Status: ACTIVE | Noted: 2022-12-12

## 2022-12-12 PROBLEM — R13.14 PHARYNGOESOPHAGEAL DYSPHAGIA: Status: ACTIVE | Noted: 2022-12-12

## 2022-12-12 PROBLEM — R49.0 MUSCLE TENSION DYSPHONIA: Status: ACTIVE | Noted: 2022-12-12

## 2023-06-02 DIAGNOSIS — E78.2 MIXED HYPERLIPIDEMIA: Primary | ICD-10-CM

## 2023-06-03 ENCOUNTER — APPOINTMENT (OUTPATIENT)
Dept: LAB | Facility: CLINIC | Age: 68
End: 2023-06-03
Payer: MEDICARE

## 2023-06-03 DIAGNOSIS — E78.2 MIXED HYPERLIPIDEMIA: ICD-10-CM

## 2023-06-03 LAB
ALBUMIN SERPL BCP-MCNC: 3.7 G/DL (ref 3.5–5)
ALP SERPL-CCNC: 61 U/L (ref 46–116)
ALT SERPL W P-5'-P-CCNC: 17 U/L (ref 12–78)
ANION GAP SERPL CALCULATED.3IONS-SCNC: 0 MMOL/L (ref 4–13)
AST SERPL W P-5'-P-CCNC: 17 U/L (ref 5–45)
BILIRUB SERPL-MCNC: 0.57 MG/DL (ref 0.2–1)
BUN SERPL-MCNC: 18 MG/DL (ref 5–25)
CALCIUM SERPL-MCNC: 9 MG/DL (ref 8.3–10.1)
CHLORIDE SERPL-SCNC: 102 MMOL/L (ref 96–108)
CHOLEST SERPL-MCNC: 250 MG/DL
CO2 SERPL-SCNC: 32 MMOL/L (ref 21–32)
CREAT SERPL-MCNC: 0.94 MG/DL (ref 0.6–1.3)
GFR SERPL CREATININE-BSD FRML MDRD: 62 ML/MIN/1.73SQ M
GLUCOSE P FAST SERPL-MCNC: 99 MG/DL (ref 65–99)
HDLC SERPL-MCNC: 72 MG/DL
LDLC SERPL CALC-MCNC: 151 MG/DL (ref 0–100)
POTASSIUM SERPL-SCNC: 4.4 MMOL/L (ref 3.5–5.3)
PROT SERPL-MCNC: 7 G/DL (ref 6.4–8.4)
SODIUM SERPL-SCNC: 134 MMOL/L (ref 135–147)
TRIGL SERPL-MCNC: 137 MG/DL

## 2023-06-03 PROCEDURE — 36415 COLL VENOUS BLD VENIPUNCTURE: CPT

## 2023-06-03 PROCEDURE — 80053 COMPREHEN METABOLIC PANEL: CPT

## 2023-06-03 PROCEDURE — 80061 LIPID PANEL: CPT

## 2023-06-05 ENCOUNTER — RA CDI HCC (OUTPATIENT)
Dept: OTHER | Facility: HOSPITAL | Age: 68
End: 2023-06-05

## 2023-06-05 NOTE — PROGRESS NOTES
[address]  coding opportunities       Chart reviewed, no opportunity found: CHART REVIEWED, NO OPPORTUNITY FOUND        Patients Insurance     Medicare Insurance: Medicare

## 2023-06-13 ENCOUNTER — OFFICE VISIT (OUTPATIENT)
Dept: FAMILY MEDICINE CLINIC | Facility: CLINIC | Age: 68
End: 2023-06-13
Payer: MEDICARE

## 2023-06-13 VITALS
RESPIRATION RATE: 16 BRPM | HEIGHT: 61 IN | WEIGHT: 135.2 LBS | TEMPERATURE: 97.5 F | HEART RATE: 88 BPM | SYSTOLIC BLOOD PRESSURE: 114 MMHG | DIASTOLIC BLOOD PRESSURE: 72 MMHG | OXYGEN SATURATION: 96 % | BODY MASS INDEX: 25.53 KG/M2

## 2023-06-13 DIAGNOSIS — E78.5 HYPERLIPIDEMIA, UNSPECIFIED HYPERLIPIDEMIA TYPE: ICD-10-CM

## 2023-06-13 DIAGNOSIS — Z00.00 MEDICARE ANNUAL WELLNESS VISIT, INITIAL: Primary | ICD-10-CM

## 2023-06-13 DIAGNOSIS — M85.80 OSTEOPENIA, UNSPECIFIED LOCATION: ICD-10-CM

## 2023-06-13 PROCEDURE — G0439 PPPS, SUBSEQ VISIT: HCPCS | Performed by: PHYSICIAN ASSISTANT

## 2023-06-13 NOTE — PATIENT INSTRUCTIONS
Medicare Preventive Visit Patient Instructions  Thank you for completing your Welcome to Medicare Visit or Medicare Annual Wellness Visit today  Your next wellness visit will be due in one year (6/13/2024)  The screening/preventive services that you may require over the next 5-10 years are detailed below  Some tests may not apply to you based off risk factors and/or age  Screening tests ordered at today's visit but not completed yet may show as past due  Also, please note that scanned in results may not display below  Preventive Screenings:  Service Recommendations Previous Testing/Comments   Colorectal Cancer Screening  * Colonoscopy    * Fecal Occult Blood Test (FOBT)/Fecal Immunochemical Test (FIT)  * Fecal DNA/Cologuard Test  * Flexible Sigmoidoscopy Age: 39-70 years old   Colonoscopy: every 10 years (may be performed more frequently if at higher risk)  OR  FOBT/FIT: every 1 year  OR  Cologuard: every 3 years  OR  Sigmoidoscopy: every 5 years  Screening may be recommended earlier than age 39 if at higher risk for colorectal cancer  Also, an individualized decision between you and your healthcare provider will decide whether screening between the ages of 74-80 would be appropriate  Colonoscopy: 03/28/2014  FOBT/FIT: Not on file  Cologuard: Not on file  Sigmoidoscopy: Not on file    Screening Current     Breast Cancer Screening Age: 36 years old  Frequency: every 1-2 years  Not required if history of left and right mastectomy Mammogram: 04/10/2023    Screening Current   Cervical Cancer Screening Between the ages of 21-29, pap smear recommended once every 3 years  Between the ages of 33-67, can perform pap smear with HPV co-testing every 5 years     Recommendations may differ for women with a history of total hysterectomy, cervical cancer, or abnormal pap smears in past  Pap Smear: 06/14/2022    Screening Not Indicated   Hepatitis C Screening Once for adults born between 1945 and 1965  More frequently in patients at high risk for Hepatitis C Hep C Antibody: Not on file    Screening Current   Diabetes Screening 1-2 times per year if you're at risk for diabetes or have pre-diabetes Fasting glucose: 99 mg/dL (6/3/2023)  A1C: No results in last 5 years (No results in last 5 years)  Screening Current   Cholesterol Screening Once every 5 years if you don't have a lipid disorder  May order more often based on risk factors  Lipid panel: 06/03/2023    Screening Not Indicated  History Lipid Disorder     Other Preventive Screenings Covered by Medicare:  1  Abdominal Aortic Aneurysm (AAA) Screening: covered once if your at risk  You're considered to be at risk if you have a family history of AAA  2  Lung Cancer Screening: covers low dose CT scan once per year if you meet all of the following conditions: (1) Age 50-69; (2) No signs or symptoms of lung cancer; (3) Current smoker or have quit smoking within the last 15 years; (4) You have a tobacco smoking history of at least 20 pack years (packs per day multiplied by number of years you smoked); (5) You get a written order from a healthcare provider  3  Glaucoma Screening: covered annually if you're considered high risk: (1) You have diabetes OR (2) Family history of glaucoma OR (3)  aged 48 and older OR (3)  American aged 72 and older  3  Osteoporosis Screening: covered every 2 years if you meet one of the following conditions: (1) You're estrogen deficient and at risk for osteoporosis based off medical history and other findings; (2) Have a vertebral abnormality; (3) On glucocorticoid therapy for more than 3 months; (4) Have primary hyperparathyroidism; (5) On osteoporosis medications and need to assess response to drug therapy  · Last bone density test (DXA Scan): 04/10/2023   5  HIV Screening: covered annually if you're between the age of 15-65  Also covered annually if you are younger than 13 and older than 72 with risk factors for HIV infection  For pregnant patients, it is covered up to 3 times per pregnancy  Immunizations:  Immunization Recommendations   Influenza Vaccine Annual influenza vaccination during flu season is recommended for all persons aged >= 6 months who do not have contraindications   Pneumococcal Vaccine   * Pneumococcal conjugate vaccine = PCV13 (Prevnar 13), PCV15 (Vaxneuvance), PCV20 (Prevnar 20)  * Pneumococcal polysaccharide vaccine = PPSV23 (Pneumovax) Adults 25-60 years old: 1-3 doses may be recommended based on certain risk factors  Adults 72 years old: 1-2 doses may be recommended based off what pneumonia vaccine you previously received   Hepatitis B Vaccine 3 dose series if at intermediate or high risk (ex: diabetes, end stage renal disease, liver disease)   Tetanus (Td) Vaccine - COST NOT COVERED BY MEDICARE PART B Following completion of primary series, a booster dose should be given every 10 years to maintain immunity against tetanus  Td may also be given as tetanus wound prophylaxis  Tdap Vaccine - COST NOT COVERED BY MEDICARE PART B Recommended at least once for all adults  For pregnant patients, recommended with each pregnancy  Shingles Vaccine (Shingrix) - COST NOT COVERED BY MEDICARE PART B  2 shot series recommended in those aged 48 and above     Health Maintenance Due:      Topic Date Due   • Hepatitis C Screening  06/13/2024 (Originally 1955)   • Colorectal Cancer Screening  03/28/2024   • Breast Cancer Screening: Mammogram  04/10/2024   • Cervical Cancer Screening  06/14/2025   • DXA SCAN  04/10/2026     Immunizations Due:      Topic Date Due   • COVID-19 Vaccine (4 - Pfizer series) 07/15/2022   • Influenza Vaccine (Season Ended) 09/01/2023     Advance Directives   What are advance directives? Advance directives are legal documents that state your wishes and plans for medical care  These plans are made ahead of time in case you lose your ability to make decisions for yourself   Advance directives can apply to any medical decision, such as the treatments you want, and if you want to donate organs  What are the types of advance directives? There are many types of advance directives, and each state has rules about how to use them  You may choose a combination of any of the following:  · Living will: This is a written record of the treatment you want  You can also choose which treatments you do not want, which to limit, and which to stop at a certain time  This includes surgery, medicine, IV fluid, and tube feedings  · Durable power of  for healthcare [facility]): This is a written record that states who you want to make healthcare choices for you when you are unable to make them for yourself  This person, called a proxy, is usually a family member or a friend  You may choose more than 1 proxy  · Do not resuscitate (DNR) order:  A DNR order is used in case your heart stops beating or you stop breathing  It is a request not to have certain forms of treatment, such as CPR  A DNR order may be included in other types of advance directives  · Medical directive: This covers the care that you want if you are in a coma, near death, or unable to make decisions for yourself  You can list the treatments you want for each condition  Treatment may include pain medicine, surgery, blood transfusions, dialysis, IV or tube feedings, and a ventilator (breathing machine)  · Values history: This document has questions about your views, beliefs, and how you feel and think about life  This information can help others choose the care that you would choose  Why are advance directives important? An advance directive helps you control your care  Although spoken wishes may be used, it is better to have your wishes written down  Spoken wishes can be misunderstood, or not followed  Treatments may be given even if you do not want them   An advance directive may make it easier for your family to make difficult choices about your care    Weight Management   Why it is important to manage your weight:  Being overweight increases your risk of health conditions such as heart disease, high blood pressure, type 2 diabetes, and certain types of cancer  It can also increase your risk for osteoarthritis, sleep apnea, and other respiratory problems  Aim for a slow, steady weight loss  Even a small amount of weight loss can lower your risk of health problems  How to lose weight safely:  A safe and healthy way to lose weight is to eat fewer calories and get regular exercise  You can lose up about 1 pound a week by decreasing the number of calories you eat by 500 calories each day  Healthy meal plan for weight management:  A healthy meal plan includes a variety of foods, contains fewer calories, and helps you stay healthy  A healthy meal plan includes the following:  · Eat whole-grain foods more often  A healthy meal plan should contain fiber  Fiber is the part of grains, fruits, and vegetables that is not broken down by your body  Whole-grain foods are healthy and provide extra fiber in your diet  Some examples of whole-grain foods are whole-wheat breads and pastas, oatmeal, brown rice, and bulgur  · Eat a variety of vegetables every day  Include dark, leafy greens such as spinach, kale, cathy greens, and mustard greens  Eat yellow and orange vegetables such as carrots, sweet potatoes, and winter squash  · Eat a variety of fruits every day  Choose fresh or canned fruit (canned in its own juice or light syrup) instead of juice  Fruit juice has very little or no fiber  · Eat low-fat dairy foods  Drink fat-free (skim) milk or 1% milk  Eat fat-free yogurt and low-fat cottage cheese  Try low-fat cheeses such as mozzarella and other reduced-fat cheeses  · Choose meat and other protein foods that are low in fat  Choose beans or other legumes such as split peas or lentils   Choose fish, skinless poultry (chicken or turkey), or lean cuts of red meat (beef or pork)  Before you cook meat or poultry, cut off any visible fat  · Use less fat and oil  Try baking foods instead of frying them  Add less fat, such as margarine, sour cream, regular salad dressing and mayonnaise to foods  Eat fewer high-fat foods  Some examples of high-fat foods include french fries, doughnuts, ice cream, and cakes  · Eat fewer sweets  Limit foods and drinks that are high in sugar  This includes candy, cookies, regular soda, and sweetened drinks  Exercise:  Exercise at least 30 minutes per day on most days of the week  Some examples of exercise include walking, biking, dancing, and swimming  You can also fit in more physical activity by taking the stairs instead of the elevator or parking farther away from stores  Ask your healthcare provider about the best exercise plan for you  © Copyright [employer] 2018 Information is for End User's use only and may not be sold, redistributed or otherwise used for commercial purposes   All illustrations and images included in CareNotes® are the copyrighted property of [employer]  or [address] [place] [address]

## 2023-06-13 NOTE — PROGRESS NOTES
Assessment and Plan:     1  AWV - conducted    2  Hyperlipidemia - , refusing statin, work on increase fiber, repeat 1 year    3  Osteopenia - work on weight bearing exercise, calcium 1200 with vitamin d and repeat in 3 years    F/u 1 year or sooner if needed     Preventive health issues were discussed with patient, and age appropriate screening tests were ordered as noted in patient's After Visit Summary  Personalized health advice and appropriate referrals for health education or preventive services given if needed, as noted in patient's After Visit Summary  History of Present Illness:     Patient presents for a Medicare Wellness Visit    HPI   Patient Care Team:  Edgardo Schroeder PA-C as PCP - General (Family Medicine)  Edgardo Schroeder PA-C     Review of Systems:     Review of Systems   Constitutional: Negative  HENT: Negative  Eyes: Negative  Respiratory: Negative  Cardiovascular: Negative  Gastrointestinal: Negative  Endocrine: Negative  Genitourinary: Negative  Musculoskeletal: Negative  Skin: Negative  Allergic/Immunologic: Negative  Neurological: Negative  Hematological: Negative  Psychiatric/Behavioral: Negative           Problem List:     Patient Active Problem List   Diagnosis   • Allergic rhinitis   • Hyperlipidemia   • Osteopenia   • Vitamin D deficiency   • 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   • Papanicolaou smear of cervix with positive high risk human papilloma virus (HPV) test   • Dysphonia   • Pharyngoesophageal dysphagia   • Paralysis of left vocal fold   • Multiple cranial neuropathy   • Glottic insufficiency   • Muscle tension dysphonia      Past Medical and Surgical History:     Past Medical History:   Diagnosis Date   • Borderline high cholesterol    • Facial nerve spasm    • Facial spasm    • HPV in female    • Osteopenia    • Papanicolaou smear 02/2021   • Shingles 10/21/2017     Past Surgical History: Procedure Laterality Date   • BRAIN SURGERY N/A 04/22/2022    Drain fluid from dura and holly from prior surgery for javid-facial spasms   • BREAST CYST EXCISION Left     1980 benign ex bx followed failed needle biopsy-entire lump removed   • COLONOSCOPY      Fiberoptic   • COLPOSCOPY  03/2021   • CRANIOTOMY  2017    For Suboccipital Cranial Nerve Decompression, per [employer]   • SKIN LESION EXCISION N/A 01/2022    upper left thigh-benign      Family History:     Family History   Problem Relation Age of Onset   • Alzheimer's disease Mother    • Hyperlipidemia Mother    • Depression Mother    • Dementia Mother    • Dementia Father    • Macular degeneration Father    • No Known Problems Sister    • No Known Problems Sister    • No Known Problems Sister    • Lymphoma Daughter 32   • Cancer Daughter         NSCHL   • Breast cancer Maternal Grandmother    • No Known Problems Maternal Grandfather    • No Known Problems Paternal Grandmother    • No Known Problems Paternal Grandfather    • No Known Problems Son    • No Known Problems Maternal Aunt    • No Known Problems Maternal Aunt    • No Known Problems Paternal Aunt    • No Known Problems Paternal Aunt    • No Known Problems Paternal Aunt    • No Known Problems Paternal Aunt    • No Known Problems Paternal Aunt    • Pancreatic cancer Cousin 64   • Pancreatic cancer Cousin 47   • Mental illness Neg Hx    • Substance Abuse Neg Hx    • Alcohol abuse Neg Hx       Social History:     Social History     Socioeconomic History   • Marital status: /Civil Union     Spouse name: None   • Number of children: None   • Years of education: None   • Highest education level: None   Occupational History   • None   Tobacco Use   • Smoking status: Never   • Smokeless tobacco: Never   Vaping Use   • Vaping Use: Never used   Substance and Sexual Activity   • Alcohol use: Yes     Comment: occasional/ not weekly   • Drug use: Never   • Sexual activity: Yes     Partners: Male Birth control/protection: Post-menopausal   Other Topics Concern   • None   Social History Narrative    Always uses seat belt    Drinks coffee, drinks 1 cup a day    Has smoke detectors     Social Determinants of Health     Financial Resource Strain: Low Risk  (6/12/2023)    Overall Financial Resource Strain (CARDIA)    • Difficulty of Paying Living Expenses: Not hard at all   Food Insecurity: Not on file   Transportation Needs: No Transportation Needs (6/12/2023)    PRAPARE - Transportation    • Lack of Transportation (Medical): No    • Lack of Transportation (Non-Medical): No   Physical Activity: Not on file   Stress: Not on file   Social Connections: Not on file   Intimate Partner Violence: Not on file   Housing Stability: Not on file      Medications and Allergies:     Current Outpatient Medications   Medication Sig Dispense Refill   • Calcium Carbonate-Vit D-Min (CALCIUM 1200 PO) Take 1 tablet by mouth daily     • Cholecalciferol (VITAMIN D3) 1000 units CAPS Take 1 tablet by mouth daily     • cyanocobalamin (VITAMIN B-12) 100 mcg tablet Take by mouth daily     • fluticasone (FLONASE) 50 mcg/act nasal spray USE 2 SPRAYS IN EACH NOSTRIL ONCE DAILY AS NEEDED 16 mL 3   • Multiple Vitamins-Minerals (MULTIVITAMIN WITH MINERALS) tablet Take 1 tablet by mouth daily     • VITAMIN B COMPLEX-C PO Take 1 tablet by mouth daily       No current facility-administered medications for this visit       Allergies   Allergen Reactions   • Pollen Extract Allergic Rhinitis      Immunizations:     Immunization History   Administered Date(s) Administered   • COVID-19 PFIZER VACCINE 0 3 ML IM 04/14/2021, 05/07/2021   • INFLUENZA 04/25/2017   • Influenza Quadrivalent Preservative Free 3 years and older IM 04/25/2017   • Pneumococcal Conjugate 13-Valent 01/25/2021   • Pneumococcal Conjugate Vaccine 20-valent (Pcv20), Polysace 06/03/2022   • Tdap 11/01/2007, 01/09/2018   • Zoster Vaccine Recombinant 01/25/2021, 03/29/2021      Health Maintenance:         Topic Date Due   • Hepatitis C Screening  06/13/2024 (Originally 1955)   • Colorectal Cancer Screening  03/28/2024   • Breast Cancer Screening: Mammogram  04/10/2024   • Cervical Cancer Screening  06/14/2025   • DXA SCAN  04/10/2026         Topic Date Due   • COVID-19 Vaccine (3 - Pfizer series) 07/02/2021   • Influenza Vaccine (Season Ended) 09/01/2023      Medicare Screening Tests and Risk Assessments:     Tad Peña is here for her Subsequent Wellness visit  Health Risk Assessment:   Patient rates overall health as very good  Patient feels that their physical health rating is same  Patient is very satisfied with their life  Eyesight was rated as same  Hearing was rated as same  Patient feels that their emotional and mental health rating is much better  Patients states they are never, rarely angry  Patient states they are never, rarely unusually tired/fatigued  Pain experienced in the last 7 days has been none  Patient states that she has experienced no weight loss or gain in last 6 months  Depression Screening:   PHQ-2 Score: 0      Fall Risk Screening: In the past year, patient has experienced: no history of falling in past year      Urinary Incontinence Screening:   Patient has not leaked urine accidently in the last six months  Home Safety:  Patient does not have trouble with stairs inside or outside of their home  Patient has working smoke alarms and has working carbon monoxide detector  Home safety hazards include: none  Nutrition:   Current diet is Regular  Medications:   Patient is currently taking over-the-counter supplements  OTC medications include: Vitamins  Patient is able to manage medications  Activities of Daily Living (ADLs)/Instrumental Activities of Daily Living (IADLs):   Walk and transfer into and out of bed and chair?: Yes  Dress and groom yourself?: Yes    Bathe or shower yourself?: Yes    Feed yourself?  Yes  Do your laundry/housekeeping?: Yes  Manage your money, pay your bills and track your expenses?: Yes  Make your own meals?: Yes    Do your own shopping?: Yes    Previous Hospitalizations:   Any hospitalizations or ED visits within the last 12 months?: No      Advance Care Planning:   Living will: No    Durable POA for healthcare: No    Advanced directive: No    End of Life Decisions reviewed with patient: Yes    Provider agrees with end of life decisions: Yes      Comments: Discussed obtaining formal documentation    Cognitive Screening:   Provider or family/friend/caregiver concerned regarding cognition?: No    PREVENTIVE SCREENINGS      Cardiovascular Screening:    General: Screening Not Indicated and History Lipid Disorder      Diabetes Screening:     General: Screening Current      Colorectal Cancer Screening:     General: Screening Current      Breast Cancer Screening:     General: Screening Current      Cervical Cancer Screening:    General: Screening Not Indicated      Osteoporosis Screening:    General: Screening Current      Abdominal Aortic Aneurysm (AAA) Screening:        General: Screening Not Indicated      Lung Cancer Screening:     General: Screening Not Indicated      Hepatitis C Screening:    General: Screening Current    Screening, Brief Intervention, and Referral to Treatment (SBIRT)    Screening  Typical number of drinks in a day: 0  Typical number of drinks in a week: 3  Interpretation: Low risk drinking behavior  AUDIT-C Screenin) How often did you have a drink containing alcohol in the past year? 2 to 4 times a month  2) How many drinks did you have on a typical day when you were drinking in the past year?  1 to 2  3) How often did you have 6 or more drinks on one occasion in the past year? never    AUDIT-C Score: 2  Interpretation: Score 0-2 (female): Negative screen for alcohol misuse    Single Item Drug Screening:  How often have you used an illegal drug (including marijuana) or a prescription medication for "non-medical reasons in the past year? never    Single Item Drug Screen Score: 0  Interpretation: Negative screen for possible drug use disorder    Brief Intervention  Alcohol & drug use screenings were reviewed  No concerns regarding substance use disorder identified  Other Counseling Topics:   Car/seat belt/driving safety, skin self-exam, sunscreen and calcium and vitamin D intake and regular weightbearing exercise  No results found  Physical Exam:     Vitals:    06/13/23 1130   BP: 114/72   Pulse: 88   Resp: 16   Temp: 97 5 °F (36 4 °C)   SpO2: 96%   Weight: 61 3 kg (135 lb 3 2 oz)   Height: 5' 0 5\" (1 537 m)     Physical Exam  Constitutional:       Appearance: Normal appearance  She is well-developed and normal weight  HENT:      Head: Normocephalic and atraumatic  Right Ear: Hearing normal       Left Ear: Hearing normal       Mouth/Throat:      Pharynx: Uvula midline  Eyes:      Extraocular Movements: Extraocular movements intact  Conjunctiva/sclera: Conjunctivae normal       Pupils: Pupils are equal, round, and reactive to light  Neck:      Thyroid: No thyromegaly  Cardiovascular:      Rate and Rhythm: Normal rate and regular rhythm  Pulses: Normal pulses  Heart sounds: Normal heart sounds  No murmur heard  Pulmonary:      Effort: Pulmonary effort is normal       Breath sounds: Normal breath sounds  Abdominal:      General: Abdomen is flat  Bowel sounds are normal  There is no distension  Palpations: Abdomen is soft  There is no mass  Tenderness: There is no abdominal tenderness  Musculoskeletal:         General: Normal range of motion  Cervical back: Normal range of motion and neck supple  Lymphadenopathy:      Cervical: No cervical adenopathy  Skin:     General: Skin is warm  Neurological:      General: No focal deficit present  Mental Status: She is alert and oriented to person, place, and time        Cranial Nerves: No cranial nerve " deficit  Deep Tendon Reflexes: Reflexes normal    Psychiatric:         Mood and Affect: Mood normal          Behavior: Behavior normal          Thought Content: Thought content normal          Judgment: Judgment normal           Alonzo Wellington PA-C     BMI Counseling: Body mass index is 25 97 kg/m²  The BMI is above normal  Nutrition recommendations include reducing portion sizes

## 2023-11-21 ENCOUNTER — OFFICE VISIT (OUTPATIENT)
Dept: OBGYN CLINIC | Facility: CLINIC | Age: 68
End: 2023-11-21
Payer: MEDICARE

## 2023-11-21 VITALS
WEIGHT: 132.2 LBS | HEIGHT: 60 IN | BODY MASS INDEX: 25.95 KG/M2 | DIASTOLIC BLOOD PRESSURE: 70 MMHG | SYSTOLIC BLOOD PRESSURE: 110 MMHG

## 2023-11-21 DIAGNOSIS — R87.810 PAPANICOLAOU SMEAR OF CERVIX WITH POSITIVE HIGH RISK HUMAN PAPILLOMA VIRUS (HPV) TEST: ICD-10-CM

## 2023-11-21 DIAGNOSIS — Z01.419 ENCOUNTER FOR ANNUAL ROUTINE GYNECOLOGICAL EXAMINATION: ICD-10-CM

## 2023-11-21 DIAGNOSIS — Z12.31 BREAST CANCER SCREENING BY MAMMOGRAM: ICD-10-CM

## 2023-11-21 DIAGNOSIS — Z80.3 FAMILY HISTORY OF BREAST CANCER IN FEMALE: Primary | ICD-10-CM

## 2023-11-21 DIAGNOSIS — Z87.2 HISTORY OF CYST OF BREAST: ICD-10-CM

## 2023-11-21 DIAGNOSIS — Z12.4 CERVICAL CANCER SCREENING: ICD-10-CM

## 2023-11-21 PROCEDURE — G0145 SCR C/V CYTO,THINLAYER,RESCR: HCPCS | Performed by: OBSTETRICS & GYNECOLOGY

## 2023-11-21 PROCEDURE — G0101 CA SCREEN;PELVIC/BREAST EXAM: HCPCS | Performed by: OBSTETRICS & GYNECOLOGY

## 2023-11-21 PROCEDURE — G0476 HPV COMBO ASSAY CA SCREEN: HCPCS | Performed by: OBSTETRICS & GYNECOLOGY

## 2023-11-21 NOTE — PROGRESS NOTES
Medicare [place] [address], [address], [place], [address] [facility]    ASSESSMENT/PLAN: Adriana Bates is a 76 y.o. [id number] who presents for [facility] gynecologic wellness exam.    Encounter for routine gynecologic examination  - Routine well woman exam completed today. - Cervical Cancer Screening last 6/2022 cotesting neg; repeat today - h/o + HR HPV recently. - Breast Cancer Screening: Last Mammogram 04/10/2023, normal  - Colorectal cancer screening last 2014 per pt, next due 2024.  - Osteoporosis screening 2023 dexa with osteopenis. - The following were reviewed in today's visit: mammography screening ordered, adequate intake of calcium and vitamin D, exercise, and healthy diet. Discussed with patient Medicare (and plans that act like Medicare) pay for wellness visit q 2 yrs - but patient may return for problems as needed. Recommend annual breast exam and mammogram.  If not seen by our office on her off year, she can still call and ask for a screening mammogram order and the nurses will provide one for her. Additional problems addressed during this visit:  1. Family history of breast cancer in female  -     Mammo screening bilateral w 3d & cad; Future    2. Breast cancer screening by mammogram  -     Mammo screening bilateral w 3d & cad; Future    3. History of cyst of breast  -     Mammo screening bilateral w 3d & cad; Future    4. Encounter for annual routine gynecological examination    5. Papanicolaou smear of cervix with positive high risk human papilloma virus (HPV) test  -     Liquid-based pap, screening    6. Cervical cancer screening  -     Liquid-based pap, screening        Next visit: 1 year pelvic - f/u pap      CC:  Medicare Gynecologic Wellness Examination    HPI: Adriana Bates is a 76 y.o. [id number] who presents for [facility] gynecologic examination. She denies any breast, urinary or pelvic issues at today's visit.     Not sexually active due to  health issues. Gyn History       She  reports that she is not currently sexually active and has had partner(s) who are male. She reports using the following method of birth control/protection: Post-menopausal.       Past Medical History:  No date: Borderline high cholesterol  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      Comment:  improved s/p surgery 2022  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  No date: Osteopenia  02/2021: Papanicolaou smear  10/21/2017: Shingles     Past Surgical History:  04/22/2022: BRAIN SURGERY; N/A      Comment:  Drain fluid from dura and holly from prior surgery for                javid-facial spasms  No date: BREAST CYST EXCISION; Left      Comment:  1980 benign ex bx followed failed needle biopsy-entire                lump removed  No date: COLONOSCOPY      Comment:  Fiberoptic  03/2021: COLPOSCOPY  2017: CRANIOTOMY      Comment:   For Suboccipital Cranial Nerve Decompression, per                [employer]  01/2022: SKIN LESION EXCISION; N/A      Comment:  upper left thigh-benign     Family History   Problem Relation Age of Onset    Alzheimer's disease Mother     Hyperlipidemia Mother     Depression Mother     Dementia Mother     Dementia Father     Macular degeneration Father     No Known Problems Sister     No Known Problems Sister     No Known Problems Sister     No Known Problems Brother     No Known Problems Brother     No Known Problems Brother     Breast cancer Maternal Grandmother     No Known Problems Maternal Grandfather     No Known Problems Paternal Grandmother     No Known Problems Paternal Grandfather     Lymphoma Daughter 32    Cancer Daughter         NSCHL    No Known Problems Son     No Known Problems Maternal Aunt     No Known Problems Maternal Aunt     No Known Problems Paternal Aunt     No Known Problems Paternal Aunt     No Known Problems Paternal Aunt     No Known Problems Paternal Aunt     No Known Problems Paternal Aunt     Pancreatic cancer Cousin 64    Pancreatic cancer Cousin 47    Mental illness Neg Hx     Substance Abuse Neg Hx     Alcohol abuse Neg Hx         Social History     Tobacco Use    Smoking status: Never    Smokeless tobacco: Never   Vaping Use    Vaping Use: Never used   Substance Use Topics    Alcohol use: Yes     Comment: occasional/ not weekly    Drug use: Never          Current Outpatient Medications:     Calcium Carbonate-Vit D-Min (CALCIUM 1200 PO), Take 1 tablet by mouth daily, Disp: , Rfl:     Cholecalciferol (VITAMIN D3) 1000 units CAPS, Take 1 tablet by mouth daily, Disp: , Rfl:     cyanocobalamin (VITAMIN B-12) 100 mcg tablet, Take by mouth daily, Disp: , Rfl:     fluticasone (FLONASE) 50 mcg/act nasal spray, USE 2 SPRAYS IN EACH NOSTRIL ONCE DAILY AS NEEDED, Disp: 16 mL, Rfl: 3    Multiple Vitamins-Minerals (MULTIVITAMIN WITH MINERALS) tablet, Take 1 tablet by mouth daily, Disp: , Rfl:     VITAMIN B COMPLEX-C PO, Take 1 tablet by mouth daily, Disp: , Rfl:     She is allergic to pollen extract. .    ROS negative except as noted in HPI    Objective:  /70 (BP Location: Left arm, Patient Position: Sitting, Cuff Size: Standard)   Ht 5' 0.25" (1.53 m)   Wt 60 kg (132 lb 3.2 oz)   BMI 25.60 kg/m²      Physical Exam  Constitutional:       Appearance: Normal appearance. Chest:   Breasts:     Right: Normal. No mass or tenderness. Left: Normal. No mass or tenderness. Abdominal:      Palpations: Abdomen is soft. Tenderness: There is no abdominal tenderness. Genitourinary:     General: Normal vulva. Vagina: No bleeding or lesions. Cervix: Normal.      Uterus: Normal. Not tender. Adnexa:         Right: No mass or tenderness. Left: No mass or tenderness. Rectum: No mass or external hemorrhoid. Normal anal tone. Comments: Atrophic changes appropriate to age  Musculoskeletal:         General: Normal range of motion. Lymphadenopathy:      Upper Body:      Right upper body: No axillary adenopathy.       Left upper body: No axillary adenopathy. Neurological:      Mental Status: She is alert and oriented to person, place, and time.    Psychiatric:         Mood and Affect: Mood normal.         Behavior: Behavior normal.

## 2023-11-21 NOTE — ASSESSMENT & PLAN NOTE
2023 osteopenia femoral neck only. Taking Ca, Vit D.    PCP orders and follows.
H/o + HR HPV 2020 and 2021. Last pap cotesting neg - repeat cotesting.
Orbital.../Buccal...

## 2023-11-23 LAB
HPV HR 12 DNA CVX QL NAA+PROBE: POSITIVE
HPV16 DNA CVX QL NAA+PROBE: NEGATIVE
HPV18 DNA CVX QL NAA+PROBE: NEGATIVE

## 2023-11-30 LAB
LAB AP GYN PRIMARY INTERPRETATION: NORMAL
Lab: NORMAL

## 2024-04-11 ENCOUNTER — HOSPITAL ENCOUNTER (OUTPATIENT)
Dept: MAMMOGRAPHY | Facility: CLINIC | Age: 69
Discharge: HOME | End: 2024-04-11
Payer: MEDICARE

## 2024-04-11 VITALS — HEIGHT: 60 IN | BODY MASS INDEX: 25.91 KG/M2 | WEIGHT: 132 LBS

## 2024-04-11 DIAGNOSIS — Z80.3 FAMILY HISTORY OF BREAST CANCER IN FEMALE: ICD-10-CM

## 2024-04-11 DIAGNOSIS — Z87.2 HISTORY OF CYST OF BREAST: ICD-10-CM

## 2024-04-11 DIAGNOSIS — Z12.31 BREAST CANCER SCREENING BY MAMMOGRAM: ICD-10-CM

## 2024-04-11 PROCEDURE — 77063 BREAST TOMOSYNTHESIS BI: CPT

## 2024-04-11 PROCEDURE — 77067 SCR MAMMO BI INCL CAD: CPT

## 2024-06-10 ENCOUNTER — APPOINTMENT (OUTPATIENT)
Dept: LAB | Facility: HOSPITAL | Age: 69
End: 2024-06-10
Payer: MEDICARE

## 2024-06-10 ENCOUNTER — TELEPHONE (OUTPATIENT)
Age: 69
End: 2024-06-10

## 2024-06-10 DIAGNOSIS — E78.00 PURE HYPERCHOLESTEROLEMIA: ICD-10-CM

## 2024-06-10 DIAGNOSIS — R53.83 FATIGUE, UNSPECIFIED TYPE: ICD-10-CM

## 2024-06-10 DIAGNOSIS — E78.00 PURE HYPERCHOLESTEROLEMIA: Primary | ICD-10-CM

## 2024-06-10 LAB
ALBUMIN SERPL BCP-MCNC: 4.6 G/DL (ref 3.5–5)
ALP SERPL-CCNC: 55 U/L (ref 34–104)
ALT SERPL W P-5'-P-CCNC: 11 U/L (ref 7–52)
ANION GAP SERPL CALCULATED.3IONS-SCNC: 7 MMOL/L (ref 4–13)
AST SERPL W P-5'-P-CCNC: 15 U/L (ref 13–39)
BASOPHILS # BLD AUTO: 0.04 THOUSANDS/ÂΜL (ref 0–0.1)
BASOPHILS NFR BLD AUTO: 1 % (ref 0–1)
BILIRUB SERPL-MCNC: 0.6 MG/DL (ref 0.2–1)
BUN SERPL-MCNC: 14 MG/DL (ref 5–25)
CALCIUM SERPL-MCNC: 9.8 MG/DL (ref 8.4–10.2)
CHLORIDE SERPL-SCNC: 99 MMOL/L (ref 96–108)
CHOLEST SERPL-MCNC: 284 MG/DL
CO2 SERPL-SCNC: 31 MMOL/L (ref 21–32)
CREAT SERPL-MCNC: 0.84 MG/DL (ref 0.6–1.3)
EOSINOPHIL # BLD AUTO: 0.1 THOUSAND/ÂΜL (ref 0–0.61)
EOSINOPHIL NFR BLD AUTO: 1 % (ref 0–6)
ERYTHROCYTE [DISTWIDTH] IN BLOOD BY AUTOMATED COUNT: 12.7 % (ref 11.6–15.1)
GFR SERPL CREATININE-BSD FRML MDRD: 71 ML/MIN/1.73SQ M
GLUCOSE SERPL-MCNC: 109 MG/DL (ref 65–140)
HCT VFR BLD AUTO: 49.8 % (ref 34.8–46.1)
HDLC SERPL-MCNC: 74 MG/DL
HGB BLD-MCNC: 16.1 G/DL (ref 11.5–15.4)
IMM GRANULOCYTES # BLD AUTO: 0.03 THOUSAND/UL (ref 0–0.2)
IMM GRANULOCYTES NFR BLD AUTO: 0 % (ref 0–2)
LDLC SERPL CALC-MCNC: 179 MG/DL (ref 0–100)
LYMPHOCYTES # BLD AUTO: 2.42 THOUSANDS/ÂΜL (ref 0.6–4.47)
LYMPHOCYTES NFR BLD AUTO: 34 % (ref 14–44)
MCH RBC QN AUTO: 30.9 PG (ref 26.8–34.3)
MCHC RBC AUTO-ENTMCNC: 32.3 G/DL (ref 31.4–37.4)
MCV RBC AUTO: 96 FL (ref 82–98)
MONOCYTES # BLD AUTO: 0.43 THOUSAND/ÂΜL (ref 0.17–1.22)
MONOCYTES NFR BLD AUTO: 6 % (ref 4–12)
NEUTROPHILS # BLD AUTO: 4.17 THOUSANDS/ÂΜL (ref 1.85–7.62)
NEUTS SEG NFR BLD AUTO: 58 % (ref 43–75)
NRBC BLD AUTO-RTO: 0 /100 WBCS
PLATELET # BLD AUTO: 299 THOUSANDS/UL (ref 149–390)
PMV BLD AUTO: 10.3 FL (ref 8.9–12.7)
POTASSIUM SERPL-SCNC: 4.4 MMOL/L (ref 3.5–5.3)
PROT SERPL-MCNC: 7.5 G/DL (ref 6.4–8.4)
RBC # BLD AUTO: 5.21 MILLION/UL (ref 3.81–5.12)
SODIUM SERPL-SCNC: 137 MMOL/L (ref 135–147)
TRIGL SERPL-MCNC: 153 MG/DL
TSH SERPL DL<=0.05 MIU/L-ACNC: 1.61 UIU/ML (ref 0.45–4.5)
WBC # BLD AUTO: 7.19 THOUSAND/UL (ref 4.31–10.16)

## 2024-06-10 PROCEDURE — 84443 ASSAY THYROID STIM HORMONE: CPT

## 2024-06-10 PROCEDURE — 80053 COMPREHEN METABOLIC PANEL: CPT

## 2024-06-10 PROCEDURE — 85025 COMPLETE CBC W/AUTO DIFF WBC: CPT

## 2024-06-10 PROCEDURE — 80061 LIPID PANEL: CPT

## 2024-06-10 PROCEDURE — 36415 COLL VENOUS BLD VENIPUNCTURE: CPT

## 2024-06-10 NOTE — TELEPHONE ENCOUNTER
Patient would like to know if she will need to go get blood work done before her appointment on 06/14/24.

## 2024-06-10 NOTE — TELEPHONE ENCOUNTER
Patient is at the lab now waiting for lab orders. She would like a call once those orders have been placed.    Cell: [phone]

## 2024-06-14 ENCOUNTER — OFFICE VISIT (OUTPATIENT)
Dept: FAMILY MEDICINE CLINIC | Facility: CLINIC | Age: 69
End: 2024-06-14
Payer: MEDICARE

## 2024-06-14 VITALS
HEIGHT: 61 IN | DIASTOLIC BLOOD PRESSURE: 78 MMHG | RESPIRATION RATE: 16 BRPM | WEIGHT: 134 LBS | SYSTOLIC BLOOD PRESSURE: 112 MMHG | HEART RATE: 109 BPM | BODY MASS INDEX: 25.3 KG/M2 | OXYGEN SATURATION: 96 % | TEMPERATURE: 98.4 F

## 2024-06-14 DIAGNOSIS — Z12.11 COLON CANCER SCREENING: ICD-10-CM

## 2024-06-14 DIAGNOSIS — Z00.00 MEDICARE ANNUAL WELLNESS VISIT, INITIAL: Primary | ICD-10-CM

## 2024-06-14 DIAGNOSIS — E78.5 HYPERLIPIDEMIA, UNSPECIFIED HYPERLIPIDEMIA TYPE: ICD-10-CM

## 2024-06-14 PROCEDURE — G0439 PPPS, SUBSEQ VISIT: HCPCS | Performed by: PHYSICIAN ASSISTANT

## 2024-06-14 NOTE — PROGRESS NOTES
Ambulatory Visit  Name: Anni Gonzalez      : 1955      MRN: [id number]  Encounter Provider: Natalie Canchola PA-C  Encounter Date: 2024   Encounter department: [facility]    Assessment & Plan   1. Medicare annual wellness visit, initial  2. Hyperlipidemia, unspecified hyperlipidemia type  -     Lipid Panel with Direct LDL reflex; Future; Expected date: 2024  3. Colon cancer screening  -     Ambulatory Referral to Gastroenterology; Future     Will work on diet and exercise and repeat lipids in 6 months, does not want a statin.    Preventive health issues were discussed with patient, and age appropriate screening tests were ordered as noted in patient's After Visit Summary. Personalized health advice and appropriate referrals for health education or preventive services given if needed, as noted in patient's After Visit Summary.    History of Present Illness     HPI   Patient Care Team:  Natalie Canchola PA-C as PCP - General (Family Medicine)  Natalie Canchola PA-C    Review of Systems   Constitutional: Negative.    HENT: Negative.     Eyes: Negative.    Respiratory: Negative.     Cardiovascular: Negative.    Gastrointestinal: Negative.    Endocrine: Negative.    Genitourinary: Negative.    Musculoskeletal: Negative.    Skin: Negative.    Allergic/Immunologic: Negative.    Neurological: Negative.    Hematological: Negative.    Psychiatric/Behavioral: Negative.       Medical History Reviewed by provider this encounter:  Allergies  Meds       Annual Wellness Visit Questionnaire   Anni is here for her Subsequent Wellness visit.     Health Risk Assessment:   Patient rates overall health as very good. Patient feels that their physical health rating is same. Patient is very satisfied with their life. Eyesight was rated as same. Hearing was rated as same. Patient feels that their emotional and mental health rating is same. Patients states they are  never, rarely angry. Patient states they are never, rarely unusually tired/fatigued. Pain experienced in the last 7 days has been none. Patient states that she has experienced no weight loss or gain in last 6 months.     Depression Screening:   PHQ-2 Score: 0      Fall Risk Screening:   In the past year, patient has experienced: no history of falling in past year      Urinary Incontinence Screening:   Patient has not leaked urine accidently in the last six months.     Home Safety:  Patient does not have trouble with stairs inside or outside of their home. Patient has working smoke alarms and has working carbon monoxide detector. Home safety hazards include: none.     Nutrition:   Current diet is Regular.     Medications:   Patient is not currently taking any over-the-counter supplements. Patient is able to manage medications.     Activities of Daily Living (ADLs)/Instrumental Activities of Daily Living (IADLs):   Walk and transfer into and out of bed and chair?: Yes  Dress and groom yourself?: Yes    Bathe or shower yourself?: Yes    Feed yourself? Yes  Do your laundry/housekeeping?: Yes  Manage your money, pay your bills and track your expenses?: Yes  Make your own meals?: Yes    Do your own shopping?: Yes    Previous Hospitalizations:   Any hospitalizations or ED visits within the last 12 months?: No      Advance Care Planning:   Living will: No    Durable POA for healthcare: No    Advanced directive: No    End of Life Decisions reviewed with patient: Yes    Provider agrees with end of life decisions: Yes      Cognitive Screening:   Provider or family/friend/caregiver concerned regarding cognition?: No    PREVENTIVE SCREENINGS      Cardiovascular Screening:    General: Screening Not Indicated, History Lipid Disorder and Screening Current      Diabetes Screening:     General: Screening Current      Colorectal Cancer Screening:     General: Risks and Benefits Discussed    Due for: Colonoscopy - Low Risk      Breast  Cancer Screening:     General: Screening Current      Cervical Cancer Screening:    General: Screening Not Indicated      Osteoporosis Screening:    General: Screening Current      Abdominal Aortic Aneurysm (AAA) Screening:        General: Screening Not Indicated      Lung Cancer Screening:     General: Screening Not Indicated      Hepatitis C Screening:    General: Screening Current    Screening, Brief Intervention, and Referral to Treatment (SBIRT)    Screening  Typical number of drinks in a day: 0  Typical number of drinks in a week: 3  Interpretation: Low risk drinking behavior.    AUDIT-C Screenin) How often did you have a drink containing alcohol in the past year? 2 to 4 times a month  2) How many drinks did you have on a typical day when you were drinking in the past year? 1 to 2  3) How often did you have 6 or more drinks on one occasion in the past year? never    AUDIT-C Score: 2  Interpretation: Score 0-2 (female): Negative screen for alcohol misuse    Single Item Drug Screening:  How often have you used an illegal drug (including marijuana) or a prescription medication for non-medical reasons in the past year? never    Single Item Drug Screen Score: 0  Interpretation: Negative screen for possible drug use disorder    Brief Intervention  Alcohol & drug use screenings were reviewed. No concerns regarding substance use disorder identified.     Social Determinants of Health     Financial Resource Strain: Low Risk  (2023)    Overall Financial Resource Strain (CARDIA)     Difficulty of Paying Living Expenses: Not hard at all   Food Insecurity: No Food Insecurity (2024)    Hunger Vital Sign     Worried About Running Out of Food in the Last Year: Never true     Ran Out of Food in the Last Year: Never true   Transportation Needs: No Transportation Needs (2024)    PRAPARE - Transportation     Lack of Transportation (Medical): No     Lack of Transportation (Non-Medical): No   Housing Stability:  "Low Risk  (6/13/2024)    Housing Stability Vital Sign     Unable to Pay for Housing in the Last Year: No     Number of Times Moved in the Last Year: 0     Homeless in the Last Year: No   Utilities: Not At Risk (6/13/2024)    [facility] Utilities     Threatened with loss of utilities: No     No results found.    Objective     /78   Pulse (!) 109   Temp 98.4 °F (36.9 °C) (Temporal)   Resp 16   Ht 5' 0.5\" (1.537 m)   Wt 60.8 kg (134 lb)   SpO2 96%   BMI 25.74 kg/m²     Physical Exam  Constitutional:       Appearance: Normal appearance. She is well-developed and normal weight.   HENT:      Head: Normocephalic and atraumatic.      Right Ear: Hearing normal.      Left Ear: Hearing normal.      Mouth/Throat:      Pharynx: Uvula midline.   Eyes:      Extraocular Movements: Extraocular movements intact.      Conjunctiva/sclera: Conjunctivae normal.      Pupils: Pupils are equal, round, and reactive to light.   Neck:      Thyroid: No thyromegaly.   Cardiovascular:      Rate and Rhythm: Normal rate and regular rhythm.      Heart sounds: Normal heart sounds. No murmur heard.  Pulmonary:      Effort: Pulmonary effort is normal.      Breath sounds: Normal breath sounds.   Abdominal:      General: Bowel sounds are normal. There is no distension.      Palpations: Abdomen is soft. There is no mass.      Tenderness: There is no abdominal tenderness.   Musculoskeletal:         General: Normal range of motion.      Cervical back: Normal range of motion and neck supple.   Lymphadenopathy:      Cervical: No cervical adenopathy.   Skin:     General: Skin is warm.   Neurological:      General: No focal deficit present.      Mental Status: She is alert and oriented to person, place, and time.      Cranial Nerves: No cranial nerve deficit.      Deep Tendon Reflexes: Reflexes normal.   Psychiatric:         Mood and Affect: Mood normal.         Behavior: Behavior normal.         Thought Content: Thought content normal.         Judgment: " Judgment normal.     Administrative Statements

## 2024-07-08 ENCOUNTER — TELEPHONE (OUTPATIENT)
Age: 69
End: 2024-07-08

## 2024-07-08 NOTE — TELEPHONE ENCOUNTER
OA Questions for EGD  Date: [  ]  Screened by: [  ]     Referring Provider: [  ]     Pre-Screening: BMI [  ]    Past EGD? If yes - Date: [   ]  Physician/Facility: [   ]  Reason: [   ]     SCHEDULING STAFF: If the patient is over 75 years old, please schedule an office visit.  ·      Does the patient want to see a gastroenterologist prior to their procedure to discuss any GI symptoms?  ·      Has the patient been hospitalized or had abdominal surgery in the past 6 months?  ·      Does the patient use supplemental oxygen?  ·      Does the patient take [Coumadin], [Lovenox], [Plavix], [Eliquis], [Xarelto], or other blood thinning medication? [Yes] [No]  ·      Has the patient had a stroke, cardiac event, or stent placed in the past year? [Yes] [No]     SCHEDULING STAFF: If patient answers NO to the above questions, then schedule the procedure. If patient answers YES to any of the above questions, then schedule an office appointment.  ·       If a repeat EGD is belated and patient declines procedure à notify provider.

## 2024-11-27 ENCOUNTER — ANNUAL EXAM (OUTPATIENT)
Dept: OBGYN CLINIC | Facility: CLINIC | Age: 69
End: 2024-11-27
Payer: MEDICARE

## 2024-11-27 VITALS
SYSTOLIC BLOOD PRESSURE: 114 MMHG | WEIGHT: 132 LBS | HEIGHT: 61 IN | DIASTOLIC BLOOD PRESSURE: 68 MMHG | BODY MASS INDEX: 24.92 KG/M2

## 2024-11-27 DIAGNOSIS — R87.810 PAPANICOLAOU SMEAR OF CERVIX WITH POSITIVE HIGH RISK HUMAN PAPILLOMA VIRUS (HPV) TEST: Primary | ICD-10-CM

## 2024-11-27 DIAGNOSIS — Z12.4 CERVICAL CANCER SCREENING: ICD-10-CM

## 2024-11-27 DIAGNOSIS — M85.859 OSTEOPENIA OF NECK OF FEMUR, UNSPECIFIED LATERALITY: ICD-10-CM

## 2024-11-27 DIAGNOSIS — Z12.31 ENCOUNTER FOR SCREENING MAMMOGRAM FOR MALIGNANT NEOPLASM OF BREAST: ICD-10-CM

## 2024-11-27 DIAGNOSIS — Z12.11 SCREENING FOR COLON CANCER: ICD-10-CM

## 2024-11-27 PROCEDURE — G0145 SCR C/V CYTO,THINLAYER,RESCR: HCPCS | Performed by: OBSTETRICS & GYNECOLOGY

## 2024-11-27 PROCEDURE — G0476 HPV COMBO ASSAY CA SCREEN: HCPCS | Performed by: OBSTETRICS & GYNECOLOGY

## 2024-11-27 PROCEDURE — 99213 OFFICE O/P EST LOW 20 MIN: CPT | Performed by: OBSTETRICS & GYNECOLOGY

## 2024-11-27 NOTE — LETTER
November 27, 2024     Natalie Canchola PA-C  [address], [address]  [facility] [address]    Patient: Anni Gonzalez   YOB: 1955   Date of Visit: 11/27/2024       Dear Dr. Canchola:    Thank you for referring Anni Gonzalez to me for evaluation. Below are my notes for this consultation.    If you have questions, please do not hesitate to call me. I look forward to following your patient along with you.         Sincerely,        Tanisha Clemons MD        CC: No Recipients    Tanisha Clemons MD  11/27/2024  9:52 AM  Sign when Signing Visit  [facility] [address], [address], [place] [address]    Assessment & Plan  Papanicolaou smear of cervix with positive high risk human papilloma virus (HPV) test  History + HR HPV, neg HPV 16, 18.  Colpo last 2021 normal.  Last pap 11/21/2023 neg pap, + HR HPV, neg 16, neg 18.  Repeat pap this year.  Follow closely.    Orders:  •  Liquid-based pap, screening    Cervical cancer screening    Orders:  •  Liquid-based pap, screening    Osteopenia of neck of femur, unspecified laterality  4/2023 osteopenia femoral neck only (left femoral neck T score -1.2, total hip -1.0), first noted in 2012.  Per pt PCP recom Ca, Vit D, exercise.  Repeat Dexa 2025.       Encounter for screening mammogram for malignant neoplasm of breast    Orders:  •  Mammo screening bilateral w 3d and cad; Future    Screening for colon cancer  [place] [address] normal.  Due for screening.  Referral to GI provided  Orders:  •  Ambulatory Referral to Gastroenterology; Future      I have spent a total time of 25 minutes in caring for this patient on the day of the visit/encounter including Diagnostic results, Prognosis, Instructions for management, Patient and family education, Impressions, Counseling / Coordination of care, Documenting in the medical record, Reviewing / ordering tests, medicine, procedures  , and Obtaining or reviewing history   .      Subjective:   Anni Gonzalez is a 69 y.o.  female.    HPI:   Patient for follow up for + HR HPV pap last year.  Also wishes breast exam today.  She denies any breast, urinary or pelvic issues at today's visit.  Not sexually active due to  health issues.          Gyn History  No LMP recorded. Patient is postmenopausal.       Last pap smear: 2023    She  reports that she is not currently sexually active and has had partner(s) who are male. She reports using the following method of birth control/protection: Post-menopausal.       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  No date: Osteopenia  2021: Papanicolaou smear  10/21/2017: Shingles     Past Surgical History:  2022: BRAIN SURGERY; N/A      Comment:  Drain fluid from dura and holly from prior surgery for                javid-facial spasms  No date: BREAST CYST EXCISION; Left      Comment:   benign ex bx followed failed needle biopsy-entire                lump removed  No date: COLONOSCOPY      Comment:  Fiberoptic  2021: COLPOSCOPY  2017: CRANIOTOMY      Comment:  For Suboccipital Cranial Nerve Decompression, per                [employer]  2022: SKIN LESION EXCISION; N/A      Comment:  upper left thigh-benign     Social History     Tobacco Use   • Smoking status: Never   • Smokeless tobacco: Never   Vaping Use   • Vaping status: Never Used   Substance Use Topics   • Alcohol use: Not Currently     Comment: occasional/ not weekly   • Drug use: Never          Current Outpatient Medications:   •  Calcium Carbonate-Vit D-Min (CALCIUM 1200 PO), Take 1 tablet by mouth daily, Disp: , Rfl:   •  Cholecalciferol (VITAMIN D3) 1000 units CAPS, Take 1 tablet by mouth daily, Disp: , Rfl:   •  Coenzyme Q10 (COQ10 PO), Take by mouth, Disp: , Rfl:   •  cyanocobalamin (VITAMIN B-12) 100 mcg tablet, Take by  "mouth daily, Disp: , Rfl:   •  fluticasone (FLONASE) 50 mcg/act nasal spray, USE 2 SPRAYS IN EACH NOSTRIL ONCE DAILY AS NEEDED, Disp: 16 mL, Rfl: 3  •  Multiple Vitamins-Minerals (MULTIVITAMIN WITH MINERALS) tablet, Take 1 tablet by mouth daily, Disp: , Rfl:   •  VITAMIN B COMPLEX-C PO, Take 1 tablet by mouth daily, Disp: , Rfl:     She is allergic to pollen extract..    ROS: Review of Systems    Objective:  /68 (BP Location: Left arm, Patient Position: Sitting, Cuff Size: Standard)   Ht 5' 0.5\" (1.537 m)   Wt 59.9 kg (132 lb)   BMI 25.36 kg/m²      Physical Exam      "

## 2024-11-27 NOTE — ASSESSMENT & PLAN NOTE
History + HR HPV, neg HPV 16, 18.  Colpo last 2021 normal.  Last pap 11/21/2023 neg pap, + HR HPV, neg 16, neg 18.  Repeat pap this year.  Follow closely.    Orders:    Liquid-based pap, screening

## 2024-11-27 NOTE — PROGRESS NOTES
[facility] [address], [address], [place] [address]    Assessment & Plan  Papanicolaou smear of cervix with positive high risk human papilloma virus (HPV) test  History + HR HPV, neg HPV 16, 18.  Colpo last  normal.  Last pap 2023 neg pap, + HR HPV, neg 16, neg 18.  Repeat pap this year.  Follow closely.    Orders:    Liquid-based pap, screening    Cervical cancer screening    Orders:    Liquid-based pap, screening    Osteopenia of neck of femur, unspecified laterality  2023 osteopenia femoral neck only (left femoral neck T score -1.2, total hip -1.0), first noted in .  Per pt PCP recom Ca, Vit D, exercise.  Repeat Dexa .       Encounter for screening mammogram for malignant neoplasm of breast    Orders:    Mammo screening bilateral w 3d and cad; Future    Screening for colon cancer  Last colonoscopy per pt  normal.  Due for screening.  Referral to GI provided  Orders:    Ambulatory Referral to Gastroenterology; Future      I have spent a total time of 25 minutes in caring for this patient on the day of the visit/encounter including Diagnostic results, Prognosis, Instructions for management, Patient and family education, Impressions, Counseling / Coordination of care, Documenting in the medical record, Reviewing / ordering tests, medicine, procedures  , and Obtaining or reviewing history  .      Subjective:   Anni Gonzalez is a 69 y.o.  female.    HPI:   Patient for follow up for + HR HPV pap last year.  Also wishes breast exam today.  She denies any breast, urinary or pelvic issues at today's visit.  Not sexually active due to  health issues.          Gyn History  No LMP recorded. Patient is postmenopausal.       Last pap smear: 2023    She  reports that she is not currently sexually active and has had partner(s) who are male. She reports using the following method of birth control/protection: Post-menopausal.       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  No date: Osteopenia  02/2021: Papanicolaou smear  10/21/2017: Shingles     Past Surgical History:  04/22/2022: BRAIN SURGERY; N/A      Comment:  Drain fluid from dura and holly from prior surgery for                javid-facial spasms  No date: BREAST CYST EXCISION; Left      Comment:  1980 benign ex bx followed failed needle biopsy-entire                lump removed  No date: COLONOSCOPY      Comment:  Fiberoptic  03/2021: COLPOSCOPY  2017: CRANIOTOMY      Comment:  For Suboccipital Cranial Nerve Decompression, per                [employer]  01/2022: SKIN LESION EXCISION; N/A      Comment:  upper left thigh-benign     Social History     Tobacco Use    Smoking status: Never    Smokeless tobacco: Never   Vaping Use    Vaping status: Never Used   Substance Use Topics    Alcohol use: Not Currently     Comment: occasional/ not weekly    Drug use: Never          Current Outpatient Medications:     Calcium Carbonate-Vit D-Min (CALCIUM 1200 PO), Take 1 tablet by mouth daily, Disp: , Rfl:     Cholecalciferol (VITAMIN D3) 1000 units CAPS, Take 1 tablet by mouth daily, Disp: , Rfl:     Coenzyme Q10 (COQ10 PO), Take by mouth, Disp: , Rfl:     cyanocobalamin (VITAMIN B-12) 100 mcg tablet, Take by mouth daily, Disp: , Rfl:     fluticasone (FLONASE) 50 mcg/act nasal spray, USE 2 SPRAYS IN EACH NOSTRIL ONCE DAILY AS NEEDED, Disp: 16 mL, Rfl: 3    Multiple Vitamins-Minerals (MULTIVITAMIN WITH MINERALS) tablet, Take 1 tablet by mouth daily, Disp: , Rfl:     VITAMIN B COMPLEX-C PO, Take 1 tablet by mouth daily, Disp: , Rfl:     She is allergic to pollen extract..    ROS: Review of Systems    Objective:  /68 (BP Location: Left arm, Patient Position: Sitting, Cuff Size: Standard)   Ht 5' 0.5\" (1.537 m)   Wt 59.9 kg (132 lb)   BMI 25.36 kg/m²      Physical Exam    "

## 2024-11-27 NOTE — ASSESSMENT & PLAN NOTE
4/2023 osteopenia femoral neck only (left femoral neck T score -1.2, total hip -1.0), first noted in 2012.  Per pt PCP recom Ca, Vit D, exercise.  Repeat Dexa 2025.

## 2024-12-05 LAB
LAB AP GYN PRIMARY INTERPRETATION: NORMAL
Lab: NORMAL

## 2024-12-10 ENCOUNTER — RESULTS FOLLOW-UP (OUTPATIENT)
Dept: OBGYN CLINIC | Facility: CLINIC | Age: 69
End: 2024-12-10

## 2024-12-10 DIAGNOSIS — R87.810 PAPANICOLAOU SMEAR OF CERVIX WITH POSITIVE HIGH RISK HUMAN PAPILLOMA VIRUS (HPV) TEST: Primary | ICD-10-CM

## 2024-12-17 ENCOUNTER — PROCEDURE VISIT (OUTPATIENT)
Dept: OBGYN CLINIC | Facility: CLINIC | Age: 69
End: 2024-12-17
Payer: MEDICARE

## 2024-12-17 VITALS
SYSTOLIC BLOOD PRESSURE: 104 MMHG | WEIGHT: 129.4 LBS | BODY MASS INDEX: 25.4 KG/M2 | HEIGHT: 60 IN | DIASTOLIC BLOOD PRESSURE: 66 MMHG

## 2024-12-17 DIAGNOSIS — R87.810 PAPANICOLAOU SMEAR OF CERVIX WITH POSITIVE HIGH RISK HUMAN PAPILLOMA VIRUS (HPV) TEST: Primary | ICD-10-CM

## 2024-12-17 PROCEDURE — 57454 BX/CURETT OF CERVIX W/SCOPE: CPT | Performed by: OBSTETRICS & GYNECOLOGY

## 2024-12-17 PROCEDURE — 88305 TISSUE EXAM BY PATHOLOGIST: CPT | Performed by: STUDENT IN AN ORGANIZED HEALTH CARE EDUCATION/TRAINING PROGRAM

## 2024-12-17 PROCEDURE — 88342 IMHCHEM/IMCYTCHM 1ST ANTB: CPT | Performed by: STUDENT IN AN ORGANIZED HEALTH CARE EDUCATION/TRAINING PROGRAM

## 2024-12-17 NOTE — ASSESSMENT & PLAN NOTE
Reviewed w/ pt abnormal pap results, the role of pap smears as screening for cervical dysplasia/cancer, colposcopy and importance of continued follow up. Colposcopy done and biopsies taken.  Reviewed to call if fever, severe pain, bleeding heavier than a period.  Expect bleeding, brown crumbly discharge from monsels.  No swimming, nothing in vagina for 7 days or until bleeding/discharge stops.  Will call pt w/ results and f/u plan.    Orders:    Tissue Exam

## 2024-12-17 NOTE — LETTER
December 17, 2024     Natalie Canchola PA-C  [address], [address]  [facility] [address]    Patient: Anni Gonzalez   YOB: 1955   Date of Visit: 12/17/2024       Dear Dr. Canchola:    Thank you for referring Anni Gonzalez to me for evaluation. Below are my notes for this consultation.    If you have questions, please do not hesitate to call me. I look forward to following your patient along with you.         Sincerely,        Tanisha Clemons MD        CC: No Recipients    Tanisha Clemons MD  12/17/2024  2:22 PM  Sign when Signing Visit  [facility] [address], [address], [place] [address]    Assessment & Plan  Papanicolaou smear of cervix with positive high risk human papilloma virus (HPV) test  Reviewed w/ pt abnormal pap results, the role of pap smears as screening for cervical dysplasia/cancer, colposcopy and importance of continued follow up. Colposcopy done and biopsies taken.  Reviewed to call if fever, severe pain, bleeding heavier than a period.  Expect bleeding, brown crumbly discharge from monsels.  No swimming, nothing in vagina for 7 days or until bleeding/discharge stops.  Will call pt w/ results and f/u plan.    Orders:  •  Tissue Exam      Next Exam: 12/2025 [place] due      Subjective:     HPI: Anni Gonzalez is a 69 y.o. who presents for colposcopy.  H/o + HR HPV testing since 2020.  Last colpo in 2021 normal and ECC negative.  Since then has been neg HR HPV and + HR HPV, most recently positive (11/2024).  Given persistence colpo recommended.  Denies pelvis issues.  Occasionally sexually active but  currently with prostate issues.        The following portions of the patient's history were reviewed and updated as appropriate: allergies, current medications, past family history, past medical history, obstetric history, gynecologic history, past social history, past surgical history and problem list.    ROS: Review of Systems   Constitutional:  Negative.    Gastrointestinal: Negative.    Genitourinary: Negative.    Psychiatric/Behavioral: Negative.           Current Outpatient Medications:   •  Calcium Carbonate-Vit D-Min (CALCIUM 1200 PO), Take 1 tablet by mouth daily, Disp: , Rfl:   •  Cholecalciferol (VITAMIN D3) 1000 units CAPS, Take 1 tablet by mouth daily, Disp: , Rfl:   •  Coenzyme Q10 (COQ10 PO), Take by mouth, Disp: , Rfl:   •  cyanocobalamin (VITAMIN B-12) 100 mcg tablet, Take by mouth daily, Disp: , Rfl:   •  fluticasone (FLONASE) 50 mcg/act nasal spray, USE 2 SPRAYS IN EACH NOSTRIL ONCE DAILY AS NEEDED, Disp: 16 mL, Rfl: 3  •  Multiple Vitamins-Minerals (MULTIVITAMIN WITH MINERALS) tablet, Take 1 tablet by mouth daily, Disp: , Rfl:   •  VITAMIN B COMPLEX-C PO, Take 1 tablet by mouth daily, Disp: , Rfl:     Objective:  There were no vitals taken for this visit.     Physical Exam  Constitutional:       Appearance: Normal appearance.   Genitourinary:     General: Normal vulva.      Vagina: Normal.      Cervix: Normal.      Uterus: Normal. Not enlarged and not tender.       Adnexa:         Right: No mass or tenderness.          Left: No mass or tenderness.        Rectum: No external hemorrhoid.            Comments: Atrophic changes; Green ECJ; blue AWC  Neurological:      Mental Status: She is alert.   Psychiatric:         Mood and Affect: Mood normal.         Behavior: Behavior normal.         Colposcopy    Date/Time: 12/17/2024 2:00 PM    Performed by: Tanisha Clemons MD  Authorized by: Tanisha Clemons MD    Verbal consent obtained?: No    Consent given by:  Patient  Patient states understanding of procedure being performed: Yes    Pre-procedure:     Premeds:  Ibuprofen    Prepped with: acetic acid    Indication:     Indications: peristent + HR HPV.  Procedure:     Procedure: Colposcopy w/ cervical biopsy and ECC      [place] speculum was placed in the vagina: yes      Under colposcopic examination the transition zone was seen in entirety: yes       Intracervical block was performed: no      Endocervix was curetted using a Kevorkian curette: yes      Cervical biopsy performed with a cervical biopsy punch: yes      Monsel's solution was applied: yes      Specimen(s) to pathology: yes    Post-procedure:     Findings: White epithelium      Impression: Low grade cervical dysplasia      Patient tolerance of procedure:  Tolerated well, no immediate complications

## 2024-12-17 NOTE — PATIENT INSTRUCTIONS
- light bleeding or spotting is normal for next few days.  If Monsels was used, discharge can appear yellowish, brown or black and sometimes crumbly.   - no intercourse or swimming until spotting or discharge stops.   - you will be called with results   - call if new fever, pelvic pain, or foul smelling discharge in next few days

## 2024-12-17 NOTE — PROGRESS NOTES
[facility] [address], [address], [place] [address]    Assessment & Plan  Papanicolaou smear of cervix with positive high risk human papilloma virus (HPV) test  Reviewed w/ pt abnormal pap results, the role of pap smears as screening for cervical dysplasia/cancer, colposcopy and importance of continued follow up. Colposcopy done and biopsies taken.  Reviewed to call if fever, severe pain, bleeding heavier than a period.  Expect bleeding, brown crumbly discharge from monsels.  No swimming, nothing in vagina for 7 days or until bleeding/discharge stops.  Will call pt w/ results and f/u plan.    Orders:    Tissue Exam      Next Exam: 12/2025 [place] due      Subjective:     HPI: Anni Gonzalez is a 69 y.o. who presents for colposcopy.  H/o + HR HPV testing since 2020.  Last colpo in 2021 normal and ECC negative.  Since then has been neg HR HPV and + HR HPV, most recently positive (11/2024).  Given persistence colpo recommended.  Denies pelvis issues.  Occasionally sexually active but  currently with prostate issues.        The following portions of the patient's history were reviewed and updated as appropriate: allergies, current medications, past family history, past medical history, obstetric history, gynecologic history, past social history, past surgical history and problem list.    ROS: Review of Systems   Constitutional: Negative.    Gastrointestinal: Negative.    Genitourinary: Negative.    Psychiatric/Behavioral: Negative.           Current Outpatient Medications:     Calcium Carbonate-Vit D-Min (CALCIUM 1200 PO), Take 1 tablet by mouth daily, Disp: , Rfl:     Cholecalciferol (VITAMIN D3) 1000 units CAPS, Take 1 tablet by mouth daily, Disp: , Rfl:     Coenzyme Q10 (COQ10 PO), Take by mouth, Disp: , Rfl:     cyanocobalamin (VITAMIN B-12) 100 mcg tablet, Take by mouth daily, Disp: , Rfl:     fluticasone (FLONASE) 50 mcg/act nasal spray, USE 2 SPRAYS IN EACH NOSTRIL ONCE DAILY AS NEEDED,  Disp: 16 mL, Rfl: 3    Multiple Vitamins-Minerals (MULTIVITAMIN WITH MINERALS) tablet, Take 1 tablet by mouth daily, Disp: , Rfl:     VITAMIN B COMPLEX-C PO, Take 1 tablet by mouth daily, Disp: , Rfl:     Objective:  There were no vitals taken for this visit.     Physical Exam  Constitutional:       Appearance: Normal appearance.   Genitourinary:     General: Normal vulva.      Vagina: Normal.      Cervix: Normal.      Uterus: Normal. Not enlarged and not tender.       Adnexa:         Right: No mass or tenderness.          Left: No mass or tenderness.        Rectum: No external hemorrhoid.            Comments: Atrophic changes; Green ECJ; blue AWC  Neurological:      Mental Status: She is alert.   Psychiatric:         Mood and Affect: Mood normal.         Behavior: Behavior normal.         Colposcopy    Date/Time: 12/17/2024 2:00 PM    Performed by: Tanisha Clemons MD  Authorized by: Tanisha Clemons MD    Verbal consent obtained?: No    Consent given by:  Patient  Patient states understanding of procedure being performed: Yes    Pre-procedure:     Premeds:  Ibuprofen    Prepped with: acetic acid    Indication:     Indications: peristent + HR HPV.  Procedure:     Procedure: Colposcopy w/ cervical biopsy and ECC      [place] speculum was placed in the vagina: yes      Under colposcopic examination the transition zone was seen in entirety: yes      Intracervical block was performed: no      Endocervix was curetted using a Kevorkian curette: yes      Cervical biopsy performed with a cervical biopsy punch: yes      Monsel's solution was applied: yes      Specimen(s) to pathology: yes    Post-procedure:     Findings: White epithelium      Impression: Low grade cervical dysplasia      Patient tolerance of procedure:  Tolerated well, no immediate complications

## 2024-12-24 PROCEDURE — 88342 IMHCHEM/IMCYTCHM 1ST ANTB: CPT | Performed by: STUDENT IN AN ORGANIZED HEALTH CARE EDUCATION/TRAINING PROGRAM

## 2024-12-24 PROCEDURE — 88305 TISSUE EXAM BY PATHOLOGIST: CPT | Performed by: STUDENT IN AN ORGANIZED HEALTH CARE EDUCATION/TRAINING PROGRAM

## 2024-12-25 ENCOUNTER — RESULTS FOLLOW-UP (OUTPATIENT)
Dept: LABOR AND DELIVERY | Facility: HOSPITAL | Age: 69
End: 2024-12-25

## 2024-12-25 DIAGNOSIS — N87.0 CIN I (CERVICAL INTRAEPITHELIAL NEOPLASIA I): Primary | ICD-10-CM

## 2025-03-21 ENCOUNTER — PREP FOR PROCEDURE (OUTPATIENT)
Age: 70
End: 2025-03-21

## 2025-03-21 ENCOUNTER — TELEPHONE (OUTPATIENT)
Age: 70
End: 2025-03-21

## 2025-03-21 DIAGNOSIS — Z12.11 SPECIAL SCREENING FOR MALIGNANT NEOPLASMS, COLON: Primary | ICD-10-CM

## 2025-03-21 NOTE — TELEPHONE ENCOUNTER
Scheduled date of colonoscopy (as of today): 04/21/2025  Physician performing colonoscopy: DR ROBLES   Location of colonoscopy: [facility]  Bowel prep reviewed with patient: DAHLIA/ADDISON  Instructions reviewed with patient by: Zoraida via telephone. Procedure directions sent via [employer].  Clearances: n/a

## 2025-03-21 NOTE — TELEPHONE ENCOUNTER
03/21/25  Screened by: Zoraida Mariano MA    Referring Provider     Pre- Screening:     There is no height or weight on file to calculate BMI.  Has patient been referred for a routine screening Colonoscopy? yes  Is the patient between 45-75 years old? yes      Previous Colonoscopy yes   If yes:    Date: 03/28/ 2014    [facility]    Reason: SCREENING          Does the patient want to see a Gastroenterologist prior to their procedure OR are they having any GI symptoms? no    Has the patient been hospitalized or had abdominal surgery in the past 6 months? no    Does the patient use supplemental oxygen? no    Does the patient take Coumadin, Lovenox, Plavix, Elliquis, Xarelto, or other blood thinning medication? no    Has the patient had a stroke, cardiac event, or stent placed in the past year? no        If patient is between 45yrs - 49yrs, please advise patient that we will have to confirm benefits & coverage with their insurance company for a routine screening colonoscopy.

## 2025-04-07 ENCOUNTER — ANESTHESIA (OUTPATIENT)
Dept: ANESTHESIOLOGY | Facility: AMBULATORY SURGERY CENTER | Age: 70
End: 2025-04-07

## 2025-04-07 ENCOUNTER — ANESTHESIA EVENT (OUTPATIENT)
Dept: ANESTHESIOLOGY | Facility: AMBULATORY SURGERY CENTER | Age: 70
End: 2025-04-07

## 2025-04-08 NOTE — PROGRESS NOTES
Assessment    1  Never a smoker   2  Encounter for preventive health examination (V70 0) (Z00 00)    Plan  Allergic rhinitis    · Fluticasone Propionate 50 MCG/ACT Nasal Suspension (Flonase); USE 2  SPRAYS IN EACH NOSTRIL ONCE DAILY AS NEEDED  Facial twitching    · 2 - [facility] (NEUROLOGY ) Physician Referral  Consult Only: the  expectation is that the referring provider will communicate back to the patient on  treatment options  Evaluation and Treatment: the expectation is that the referred to  provider will communicate back to the patient on treatment options  Status: Active   Requested for: 78TLE4236  Care Summary provided  : Yes  Health Maintenance    · Follow-up visit in 1 year Evaluation and Treatment  Follow-up  Status: Hold For -  Scheduling  Requested for: 25YQQ8087  Hyperlipidemia    · Begin or continue regular aerobic exercise  Gradually work up to at least 4 sessions of 30  minutes of exercise a week ; Status:Complete;   Done: 52AHI3246 10:38PM   · Eat a low fat and low cholesterol diet ; Status:Complete;   Done: 54POV4245 10:38PM    Discussion/Summary  health maintenance visit Currently, she eats a healthy diet and has an adequate exercise regimen  cervical cancer screening is current cervical cancer screening is managed by [facility] Breast cancer screening: monthly self breast exam was advised, mammogram is current and the next mammogram is due 01/2018  Colorectal cancer screening: colorectal cancer screening is current and the next colonoscopy is due 2024  Osteoporosis screening: bone mineral density testing has been ordered  The discussed Zostavax  Advice and education were given regarding nutrition, weight bearing exercise, calcium supplements, vitamin D supplements, sunscreen use and self skin examination  Patient discussion: discussed with the patient        Chief Complaint  Pt presents in the office today for a 6 month recheck for lipids and for a PE;    Colon due Subjective:      Patient ID: Ramu Arce is a 58 y.o. male.    States went to ER for chest pain- had some labs drawn - was diag with a pulled muscle an dwas discharged. Patient did not do any labs and wants me to restart him on meds .  His BP is ok off meds - come down since last visit   He will be advised to do labs and see me back asap for restarting meds for BP, Chol .Adviced to wean down on tobacco .    Review of Systems   Constitutional:  Negative for chills and fever.   HENT:  Negative for congestion, ear pain, hearing loss, nosebleeds, sore throat and tinnitus.    Eyes:  Negative for photophobia, pain, discharge and redness.   Respiratory:  Negative for cough, shortness of breath and stridor.    Cardiovascular:  Negative for chest pain, palpitations and leg swelling.   Gastrointestinal:  Negative for abdominal pain, blood in stool, constipation, diarrhea, nausea and vomiting.   Endocrine: Negative for polydipsia.   Genitourinary:  Negative for dysuria, flank pain, frequency, hematuria and urgency.   Musculoskeletal:  Negative for back pain, myalgias and neck pain.   Skin:  Negative for rash.   Allergic/Immunologic: Negative for environmental allergies.   Neurological:  Negative for dizziness, tremors, seizures, weakness and headaches.   Hematological:  Does not bruise/bleed easily.   Psychiatric/Behavioral:  Negative for hallucinations and suicidal ideas. The patient is not nervous/anxious.        Objective:   Physical Exam  Constitutional:       General: He is not in acute distress.     Appearance: He is well-developed. He is not diaphoretic.   HENT:      Head: Normocephalic and atraumatic.      Right Ear: External ear normal.      Left Ear: External ear normal.      Nose: Nose normal.      Mouth/Throat:      Mouth: Mucous membranes are moist.      Pharynx: No oropharyngeal exudate.   Eyes:      General: No scleral icterus.        Right eye: No discharge.         Left eye: No discharge.       03/28/2024  Mammo due 01/25/2018  Pap due 01/09/2015; Will fax for records from [place]        History of Present Illness  HM, Adult Female: The patient is being seen for a health maintenance evaluation  The last health maintenance visit was 1 year(s) ago  General Health: The patient's health since the last visit is described as good  She has regular dental visits  She denies vision problems  She denies hearing loss  Immunizations status: up to date The patient needs the following immunization(s): zoster vaccine  Lifestyle:  She consumes a diverse and healthy diet  She exercises regularly  She does not use tobacco  She consumes alcohol  She reports occasional alcohol use and drinking 1-2 drinks per week  She typically drinks wine  She denies drug use  Reproductive health: the patient is postmenopausal    Screening:      Review of Systems    Constitutional: No fever, no chills, feels well, no tiredness, no recent weight gain or weight loss  Eyes: no eye pain, no eyesight problems, eyes not red and no purulent discharge from the eyes  ENT: no earache, no nosebleeds, no sore throat, no nasal discharge and no hoarseness  Cardiovascular: No complaints of slow heart rate, no fast heart rate, no chest pain, no palpitations, no leg claudication, no lower extremity edema  Respiratory: No complaints of shortness of breath, no wheezing, no cough, no SOB on exertion, no orthopnea, no PND  Gastrointestinal: No complaints of abdominal pain, no constipation, no nausea or vomiting, no diarrhea, no bloody stools  Genitourinary: no dysuria, no pelvic pain, no vaginal discharge and no unexplained vaginal bleeding  Musculoskeletal: No complaints of arthralgias, no myalgias, no joint swelling or stiffness, no limb pain or swelling  Integumentary: no rashes, no breast pain, no skin lesions and no breast lump     Neurological: intermittent left sided facial twitching, but no headache, no numbness, no tingling, no dizziness, no limb weakness, no fainting and no difficulty walking  Psychiatric: no anxiety, no sleep disturbances, no depression and no emotional problems  Hematologic/Lymphatic: no swollen glands  Active Problems    1  Allergic rhinitis (477 9) (J30 9)   2  Encounter for routine gynecological examination (V72 31) (Z01 419)   3  Encounter for screening mammogram for malignant neoplasm of breast (V76 12)   (Z12 31)   4  Facial twitching (351 8) (G51 4)   5  Hyperlipidemia (272 4) (E78 5)   6  Osteopenia (733 90) (M85 80)   7  Screening for osteoporosis (V82 81) (Z13 820)   8  Vitamin D deficiency (268 9) (E55 9)    Past Medical History    · Acute bronchitis (466 0) (J20 9)   · History of Colonoscopy (Fiberoptic) Screening   · History of Encounter for screening mammogram for malignant neoplasm of breast  (V76 12) (Z12 31)   · History of acute sinusitis (V12 69) (Z87 09)   · History of viral infection (V12 09) (Z86 19)    Surgical History    · History of Biopsy Breast Percutaneous Needle Core    Family History  Mother    · Family history of Alzheimer Disease   · Family history of Hyperlipidemia  Father    · Family history of Dementia   · Family history of Macular Degeneration  Daughter    · Family history of lymphoma (V16 7) (Z80 2)  Paternal Cousin    · Family history of pancreatic cancer (V16 0) (Z80 0)    Social History    · Alcohol Use (History)   · Social usage, 2 glasses of wine week  · Always uses seat belt   · Caffeine Use   · 1 cup coffee per day   · Denied: History of Drug Use   · Has smoke detectors   · Never a smoker   · Denied: History of Never Drank Alcohol   · Uses Safety Equipment - Seatbelts    Current Meds   1  Calcium TABS; Take 1 tablet daily; Therapy: (Recorded:95Luc1044) to Recorded   2  Co Q 10 CAPS; TAKE AS DIRECTED; Therapy: (Yovana Melina) to Recorded   3  Fluticasone Propionate 50 MCG/ACT Nasal Suspension; use 2 sprays in each nostril   once daily;    Therapy: 35VOI9958 to (Evaluate:24Mar2016)  Requested for: 23Feb2016; Last   Rx:23Feb2016 Ordered   4  Multiple Vitamins TABS; TAKE 1 TABLET DAILY; Therapy: (Recorded:23Feb2016) to Recorded   5  Vitamin B Complex Oral Tablet; TAKE 1 TABLET DAILY; Therapy: (Recorded:23Feb2016) to Recorded   6  Vitamin D3 1000 UNIT Oral Tablet; TAKE 1 TABLET DAILY; Last Rx:23Mar2016 Ordered    Allergies    1  No Known Drug Allergies    2  Other   3  Seasonal    Vitals   Recorded: 80ZGY7863 08:32AM   Heart Rate 80   Respiration 16   Systolic 172   Diastolic 74   Height 5 ft 1 75 in   Weight 140 lb 9 6 oz   BMI Calculated 25 93   BSA Calculated 1 64     Physical Exam    Constitutional   General appearance: No acute distress, well appearing and well nourished  Eyes   Conjunctiva and lids: No swelling, erythema or discharge  PERRL, EOMI  Ears, Nose, Mouth, and Throat   Otoscopic examination: Tympanic membranes translucent with normal light reflex  Canals patent without erythema  Lips, teeth, and gums: Normal, good dentition  Oropharynx: Normal with no erythema, edema, exudate or lesions  Neck   Neck: Supple, symmetric, trachea midline, no masses  Thyroid: Normal, no thyromegaly  Pulmonary   Respiratory effort: No increased work of breathing or signs of respiratory distress  Auscultation of lungs: Clear to auscultation  Cardiovascular   Auscultation of heart: Normal rate and rhythm, normal S1 and S2, no murmurs  Examination of extremities for edema and/or varicosities: Normal     Abdomen   Abdomen: Non-tender, no masses  Liver and spleen: No hepatomegaly or splenomegaly  Lymphatic   Palpation of lymph nodes in neck: No lymphadenopathy  Neurologic   Cranial nerves: Cranial nerves II-XII intact  Reflexes: 2+ and symmetric  Psychiatric   Orientation to person, place, and time: Normal     Recent and remote memory: Intact      Mood and affect: Normal        Results/Data  PHQ-2 Adult Depression Screening 09IZT1963 08: 36AM User, [facility]     Test Name Result Flag Reference   PHQ-2 Adult Depression Score 0     Over the last two weeks, how often have you been bothered by any of the following problems? Little interest or pleasure in doing things: Not at all - 0  Feeling down, depressed, or hopeless: Not at all - 0   PHQ-2 Adult Depression Screening Negative       (1) LIPID PANEL, FASTING 20Mar2017 09:55AM Cynthia Peeling   TW Order Number: [id number]     Test Name Result Flag Reference   CHOLESTEROL 226 mg/dL H    HDL,DIRECT 78 mg/dL H 40-60   Specimen collection should occur prior to Metamizole administration due to the potential for falsely depressed results  LDL CHOLESTEROL CALCULATED 127 mg/dL H 0-100   - Patient Instructions: This is a fasting blood test  Water,black tea or black  coffee only after 9:00pm the night before test   Drink 2 glasses of water the morning of test     - Patient Instructions: This is a fasting blood test  Water,black tea or black  coffee only after 9:00pm the night before test Drink 2 glasses of water the morning of test   Triglyceride:         Normal              <150 mg/dl       Borderline High    150-199 mg/dl       High               200-499 mg/dl       Very High          >499 mg/dl  Cholesterol:         Desirable        <200 mg/dl      Borderline High  200-239 mg/dl      High             >239 mg/dl  HDL Cholesterol:        High    >59 mg/dL      Low     <41 mg/dL  LDL CALCULATED:    This screening LDL is a calculated result  It does not have the accuracy of the Direct Measured LDL in the monitoring of patients with hyperlipidemia and/or statin therapy  Direct Measure LDL (XNY838) must be ordered separately in these patients  TRIGLYCERIDES 106 mg/dL  <=150   Specimen collection should occur prior to N-Acetylcysteine or Metamizole administration due to the potential for falsely depressed results       (1) TSH WITH FT4 REFLEX 20Mar2017 09:55AM Cynthia Peeling   TW Order Number: [id number]     Test Name Result Flag Reference   TSH 1 530 uIU/mL  0 358-3 740   - Patient Instructions: This is a fasting blood test  Water,black tea or black  coffee only after 9:00pm the night before test Drink 2 glasses of water the morning of test   Patients undergoing fluorescein dye angiography may retain small amounts of fluorescein in the body for 48-72 hours post procedure  Samples containing fluorescein can produce falsely depressed TSH values  If the patient had this procedure,a specimen should be resubmitted post fluorescein clearance  The recommended reference ranges for TSH during pregnancy are as follows:  First trimester 0 1 to 2 5 uIU/mL  Second trimester  0 2 to 3 0 uIU/mL  Third trimester 0 3 to 3 0 uIU/m     (1) CBC/PLT/DIFF 86BGY5914 09:55AM Julia Nicole    Order Number: [id number]     Test Name Result Flag Reference   WBC COUNT 4 57 Thousand/uL  4 31-10 16   RBC COUNT 4 51 Million/uL  3 81-5 12   HEMOGLOBIN 14 1 g/dL  11 5-15 4   HEMATOCRIT 42 2 %  34 8-46  1   MCV 94 fL  82-98   MCH 31 3 pg  26 8-34 3   MCHC 33 4 g/dL  31 4-37 4   RDW 13 6 %  11 6-15 1   MPV 11 4 fL  8 9-12 7   PLATELET COUNT 870 Thousands/uL  149-390   nRBC AUTOMATED 0 /100 WBCs     NEUTROPHILS RELATIVE PERCENT 49 %  43-75   LYMPHOCYTES RELATIVE PERCENT 42 %  14-44   MONOCYTES RELATIVE PERCENT 6 %  4-12   EOSINOPHILS RELATIVE PERCENT 3 %  0-6   BASOPHILS RELATIVE PERCENT 0 %  0-1   NEUTROPHILS ABSOLUTE COUNT 2 23 Thousands/? ??L  1 85-7 62   LYMPHOCYTES ABSOLUTE COUNT 1 91 Thousands/? ??L  0 60-4 47   MONOCYTES ABSOLUTE COUNT 0 28 Thousand/? ??L  0 17-1 22   EOSINOPHILS ABSOLUTE COUNT 0 13 Thousand/? ??L  0 00-0 61   BASOPHILS ABSOLUTE COUNT 0 01 Thousands/? ??L  0 00-0 10   - Patient Instructions: This bloodwork is non-fasting  Please drink two glasses of water morning of bloodwork  - Patient Instructions: This bloodwork is non-fasting  Please drink two glasses of water morning of bloodwork  (1) COMPREHENSIVE METABOLIC PANEL 46RDD1202 64:90BW Urszula LEWIS Order Number: [id number]     Test Name Result Flag Reference   SODIUM 139 mmol/L  136-145   POTASSIUM 3 8 mmol/L  3 5-5 3   CHLORIDE 101 mmol/L  100-108   CARBON DIOXIDE 31 mmol/L  21-32   ANION GAP (CALC) 7 mmol/L  4-13   BLOOD UREA NITROGEN 12 mg/dL  5-25   CREATININE 0 75 mg/dL  0 60-1 30   Standardized to IDMS reference method   CALCIUM 8 7 mg/dL  8 3-10 1   BILI, TOTAL 0 57 mg/dL  0 20-1 00   ALK PHOSPHATAS 62 U/L     ALT (SGPT) 22 U/L  12-78   AST(SGOT) 17 U/L  5-45   ALBUMIN 3 7 g/dL  3 5-5 0   TOTAL PROTEIN 6 9 g/dL  6 4-8 2   eGFR Non-African American      >60 0 ml/min/1 73sq m   - Patient Instructions: This is a fasting blood test  Water,black tea or black  coffee only after 9:00pm the night before test Drink 2 glasses of water the morning of test   National Kidney Disease Education Program recommendations are as follows:  GFR calculation is accurate only with a steady state creatinine  Chronic Kidney disease less than 60 ml/min/1 73 sq  meters  Kidney failure less than 15 ml/min/1 73 sq  meters  GLUCOSE FASTING 87 mg/dL  65-99     * MAMMO SCREENING BILATERAL W CAD 25Jan2017 10:21AM EPIC, Provider   Test ordered by: Ana Henry     Test Name Result Flag Reference   MAMMO SCREENING BILATERAL W CAD (Report)     Patient History:   Patient is postmenopausal    Family history of breast cancer in maternal grandmother at age    39  Excisional biopsy of the left breast    Patient has never smoked  Patient's BMI is 23 8  Reason for exam: screening (asymptomatic)  Mammo Screening Bilateral W CAD: January 25, 2017 - Check In #:    [de-identified]   Bilateral MLO and CC view(s) were taken  Technologist: RT Anat(R)(M)   Prior study comparison: December 30, 2015, digital bilateral    screening mammogram performed at [address]   December 27, 2014, digital bilateral    screening mammogram performed at [facility]  November 23, 2013, digital bilateral    screening mammogram performed at [facility]  November 17, 2012, digital bilateral    screening mammogram performed at [facility]  September 21, 2011, digital bilateral    screening mammogram performed at [facility]  September 15, 2010, bilateral OPMA digital    scrn mammo w/CAD, performed at [address] [facility]  There are scattered fibroglandular densities  No dominant soft tissue mass, architectural distortion or    suspicious calcifications are noted in either breast  The skin    and nipple contours are within normal limits  No evidence of malignancy  No significant changes when compared with prior studies  ASSESSMENT: BiRad:1 - Negative     Recommendation:   Routine screening mammogram of both breasts in 1 year  A    reminder letter will be scheduled  Analyzed by CAD     8-10% of cancers will be missed on mammography  Management of a    palpable abnormality must be based on clinical grounds  Patients   will be notified of their results via letter from our facility  Accredited by [employer] of Radiology and FDA  Transcription Location: [facility]: [id number]     Risk Value(s):   Tyrer-Cuzick 10 Year: 3 359%, Tyrer-Cuzick Lifetime: 8 043%,    Myriad Table: 2 6%, SREEKANTH 5 Year: 2 4%, NCI Lifetime: 11 1%   Signed by:   Lucas Sherwood MD   1/25/17       Health Management  Encounter for routine gynecological examination   (every) THINPREP PAP; every 5 years; Last 38RJW1784; Next Due: 58TCC5802; Active  Digital Bilateral Screening Mammogram With CAD; every 1 year; Last 12ODA7392; Next  Due: 47Yti6535;  Overdue  Encounter for screening mammogram for malignant neoplasm of breast   Digital Bilateral Screening Mammogram With CAD; every 1 year; Last 31HHL2141; Next  Due: 34Sez0219; Overdue  History of Colonoscopy (Fiberoptic) Screening   COLONOSCOPY; every 10 years; Last 68ZPF2088; Next Due: 79CSA8742;  Active    Signatures   Electronically signed by : MCKAYLA Vaca ; Mar 29 2017 10:41PM EST                       (Author)

## 2025-04-09 ENCOUNTER — TELEPHONE (OUTPATIENT)
Dept: GASTROENTEROLOGY | Facility: CLINIC | Age: 70
End: 2025-04-09

## 2025-04-21 ENCOUNTER — HOSPITAL ENCOUNTER (OUTPATIENT)
Dept: GASTROENTEROLOGY | Facility: AMBULATORY SURGERY CENTER | Age: 70
Discharge: HOME | End: 2025-04-21
Attending: INTERNAL MEDICINE
Payer: MEDICARE

## 2025-04-21 ENCOUNTER — ANESTHESIA EVENT (OUTPATIENT)
Dept: GASTROENTEROLOGY | Facility: AMBULATORY SURGERY CENTER | Age: 70
End: 2025-04-21

## 2025-04-21 ENCOUNTER — ANESTHESIA (OUTPATIENT)
Dept: GASTROENTEROLOGY | Facility: AMBULATORY SURGERY CENTER | Age: 70
End: 2025-04-21

## 2025-04-21 VITALS
TEMPERATURE: 97.4 F | RESPIRATION RATE: 22 BRPM | DIASTOLIC BLOOD PRESSURE: 59 MMHG | HEART RATE: 69 BPM | OXYGEN SATURATION: 97 % | BODY MASS INDEX: 24.55 KG/M2 | WEIGHT: 130 LBS | SYSTOLIC BLOOD PRESSURE: 91 MMHG | HEIGHT: 61 IN

## 2025-04-21 DIAGNOSIS — Z12.11 SPECIAL SCREENING FOR MALIGNANT NEOPLASMS, COLON: ICD-10-CM

## 2025-04-21 PROCEDURE — G0121 COLON CA SCRN NOT HI RSK IND: HCPCS | Performed by: INTERNAL MEDICINE

## 2025-04-21 RX ORDER — PROPOFOL 10 MG/ML
INJECTION, EMULSION INTRAVENOUS AS NEEDED
Status: DISCONTINUED | OUTPATIENT
Start: 2025-04-21 | End: 2025-04-21

## 2025-04-21 RX ORDER — SODIUM CHLORIDE, SODIUM LACTATE, POTASSIUM CHLORIDE, CALCIUM CHLORIDE 600; 310; 30; 20 MG/100ML; MG/100ML; MG/100ML; MG/100ML
50 INJECTION, SOLUTION INTRAVENOUS CONTINUOUS
Status: DISCONTINUED | OUTPATIENT
Start: 2025-04-21 | End: 2025-04-25 | Stop reason: HOSPADM

## 2025-04-21 RX ADMIN — PROPOFOL 100 MG: 10 INJECTION, EMULSION INTRAVENOUS at 08:49

## 2025-04-21 RX ADMIN — SODIUM CHLORIDE, SODIUM LACTATE, POTASSIUM CHLORIDE, CALCIUM CHLORIDE 50 ML/HR: 600; 310; 30; 20 INJECTION, SOLUTION INTRAVENOUS at 08:40

## 2025-04-21 RX ADMIN — SODIUM CHLORIDE, SODIUM LACTATE, POTASSIUM CHLORIDE, CALCIUM CHLORIDE: 600; 310; 30; 20 INJECTION, SOLUTION INTRAVENOUS at 09:00

## 2025-04-21 RX ADMIN — PROPOFOL 100 MG: 10 INJECTION, EMULSION INTRAVENOUS at 08:52

## 2025-04-21 RX ADMIN — PROPOFOL 50 MG: 10 INJECTION, EMULSION INTRAVENOUS at 08:55

## 2025-04-21 NOTE — ANESTHESIA POSTPROCEDURE EVALUATION
Post-Op Assessment Note    CV Status:  Stable  Pain Score: 0    Pain management: adequate       Mental Status:  Alert and awake   Hydration Status:  Euvolemic   PONV Controlled:  Controlled   Airway Patency:  Patent     Post Op Vitals Reviewed: Yes    No anethesia notable event occurred.    Staff: CRNA           Last Filed PACU Vitals:  Vitals Value Taken Time   Temp 98 901   Pulse 75    BP 98/61    Resp 16    SpO2 99

## 2025-04-21 NOTE — H&P
History and Physical -  [facility]  Anni Gonzalez 69 y.o. female MRN: [id number]    HPI: Anni Gonzalez is a 69 y.o. year old female who presents for average risk screening colonoscopy    REVIEW OF SYSTEMS: Per the HPI, and otherwise unremarkable.    Historical Information   Past Medical History:   Diagnosis Date    Borderline high cholesterol     Facial nerve spasm     improved s/p surgery 2022    HPV in female     Osteopenia     Papanicolaou smear 02/2021    Shingles 10/21/2017     Past Surgical History:   Procedure Laterality Date    BRAIN SURGERY N/A 04/22/2022    Drain fluid from dura and holly from prior surgery for javid-facial spasms    BREAST CYST EXCISION Left     1980 benign ex bx followed failed needle biopsy-entire lump removed    COLONOSCOPY      Fiberoptic    COLPOSCOPY  03/2021    CRANIOTOMY  2017    For Suboccipital Cranial Nerve Decompression, per [employer]    SKIN LESION EXCISION N/A 01/2022    upper left thigh-benign     Social History   Social History     Substance and Sexual Activity   Alcohol Use Not Currently    Comment: occasional/ not weekly     Social History     Substance and Sexual Activity   Drug Use Never     Social History     Tobacco Use   Smoking Status Never   Smokeless Tobacco Never     Family History   Problem Relation Age of Onset    Alzheimer's disease Mother     Hyperlipidemia Mother     Depression Mother     Dementia Mother     Dementia Father     Macular degeneration Father     No Known Problems Sister     No Known Problems Sister     No Known Problems Sister     Lymphoma Daughter 31    Cancer Daughter         [facility]    Breast cancer Maternal Grandmother 45    No Known Problems Maternal Grandfather     No Known Problems Paternal Grandmother     No Known Problems Paternal Grandfather     No Known Problems Brother     No Known Problems Brother     No Known Problems Brother     No Known Problems Son     No Known Problems Maternal Aunt     No Known Problems  "Maternal Aunt     No Known Problems Paternal Aunt     No Known Problems Paternal Aunt     No Known Problems Paternal Aunt     No Known Problems Paternal Aunt     No Known Problems Paternal Aunt     Pancreatic cancer Cousin 61    Pancreatic cancer Cousin 54    Mental illness Neg Hx     Substance Abuse Neg Hx     Alcohol abuse Neg Hx        Meds/Allergies       Current Outpatient Medications:     Calcium Carbonate-Vit D-Min (CALCIUM 1200 PO)    Cholecalciferol (VITAMIN D3) 1000 units CAPS    Coenzyme Q10 (COQ10 PO)    cyanocobalamin (VITAMIN B-12) 100 mcg tablet    Multiple Vitamins-Minerals (MULTIVITAMIN WITH MINERALS) tablet    VITAMIN B COMPLEX-C PO    fluticasone (FLONASE) 50 mcg/act nasal spray    Current Facility-Administered Medications:     lactated ringers infusion, 50 mL/hr, Intravenous, Continuous, 50 mL/hr at 04/21/25 0840    Allergies   Allergen Reactions    Pollen Extract Allergic Rhinitis       Objective     /75   Pulse 87   Temp (!) 97.4 °F (36.3 °C) (Temporal)   Resp 19   Ht 5' 1\" (1.549 m)   Wt 59 kg (130 lb)   SpO2 98%   BMI 24.56 kg/m²     PHYSICAL EXAM    Gen: NAD AAOx3  Head: Normocephalic, Atraumatic  CV: S1S2 RRR no m/r/g  CHEST: Clear b/l no c/r/w  ABD: soft, +BS NT/ND  EXT: no edema    ASSESSMENT/PLAN:  This is a 69 y.o. year old female here for average risk screening colonoscopy, and she is stable and optimized for her procedure.        "

## 2025-04-21 NOTE — ANESTHESIA PREPROCEDURE EVALUATION
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      Anesthesia Plan  ASA Score- 2     Anesthesia Type- IV sedation with anesthesia with ASA Monitors.         Additional Monitors:     Airway Plan:            Plan Factors-Exercise tolerance (METS): >4 METS.    Chart reviewed.        Patient is not a current smoker.  Patient did not smoke on day of surgery.    Obstructive sleep apnea risk education given perioperatively.        Induction- intravenous.    Postoperative Plan- Plan for postoperative opioid use. Planned trial extubation    Perioperative Resuscitation Plan - Level 1 - Full Code.       Informed Consent- Anesthetic plan and risks discussed with patient.  I personally reviewed this patient with the CRNA. Discussed and agreed on the Anesthesia Plan with the CRNA..      NPO Status:  Vitals Value Taken Time   Date of last liquid 04/21/25 04/21/25 0827   Time of last liquid 0430 04/21/25 0827   Date of last solid 04/19/25 04/21/25 0827   Time of last solid 1700 04/21/25 0827     NPO appropriate. Discussed benefits/risks of monitored anesthetic care and discussed providing a dynamic level of mild to deep sedation. Risks include awareness, airway obstruction, aspiration which may necessitate conversion to general anesthesia. All questions answered. Patient understands and wishes to proceed.    Anesthesia plan and consent discussed with Anni who expressed understanding and agreement. Risks/benefits and alternatives discussed with patient including possible PONV, sore throat, damage to teeth/lips/gums and possibility of rare anesthetic and surgical emergencies.

## 2025-04-29 ENCOUNTER — HOSPITAL ENCOUNTER (OUTPATIENT)
Dept: MAMMOGRAPHY | Facility: CLINIC | Age: 70
Discharge: HOME | End: 2025-04-29
Payer: MEDICARE

## 2025-04-29 VITALS — BODY MASS INDEX: 24.55 KG/M2 | HEIGHT: 61 IN | WEIGHT: 130 LBS

## 2025-04-29 DIAGNOSIS — Z12.31 ENCOUNTER FOR SCREENING MAMMOGRAM FOR MALIGNANT NEOPLASM OF BREAST: ICD-10-CM

## 2025-04-29 PROCEDURE — 77063 BREAST TOMOSYNTHESIS BI: CPT

## 2025-04-29 PROCEDURE — 77067 SCR MAMMO BI INCL CAD: CPT
